# Patient Record
Sex: FEMALE | Race: WHITE | Employment: OTHER | URBAN - METROPOLITAN AREA
[De-identification: names, ages, dates, MRNs, and addresses within clinical notes are randomized per-mention and may not be internally consistent; named-entity substitution may affect disease eponyms.]

---

## 2018-01-29 ENCOUNTER — HOSPITAL ENCOUNTER (OUTPATIENT)
Dept: MAMMOGRAPHY | Age: 43
Discharge: HOME | End: 2018-01-29
Attending: OBSTETRICS & GYNECOLOGY
Payer: COMMERCIAL

## 2018-01-29 DIAGNOSIS — Z12.39 BREAST CANCER SCREENING: ICD-10-CM

## 2018-01-29 PROCEDURE — 77067 SCR MAMMO BI INCL CAD: CPT

## 2018-08-16 PROBLEM — N80.03 ADENOMYOSIS: Status: ACTIVE | Noted: 2018-08-16

## 2018-08-16 PROBLEM — N80.30 PELVIC PERITONEAL ENDOMETRIOSIS: Status: ACTIVE | Noted: 2018-08-16

## 2018-11-01 ENCOUNTER — HOSPITAL ENCOUNTER (OUTPATIENT)
Dept: ULTRASOUND IMAGING | Age: 43
Discharge: HOME | End: 2018-11-01
Payer: COMMERCIAL

## 2018-11-01 DIAGNOSIS — M79.672 LEFT FOOT PAIN: ICD-10-CM

## 2018-11-01 PROCEDURE — 93926 LOWER EXTREMITY STUDY: CPT

## 2019-06-12 ENCOUNTER — HOSPITAL ENCOUNTER (OUTPATIENT)
Dept: MAMMOGRAPHY | Age: 44
Discharge: HOME | End: 2019-06-12
Attending: OBSTETRICS & GYNECOLOGY

## 2019-06-12 DIAGNOSIS — Z12.39 BREAST CANCER SCREENING: ICD-10-CM

## 2020-07-24 ENCOUNTER — HOSPITAL ENCOUNTER (OUTPATIENT)
Dept: MAMMOGRAPHY | Age: 45
Discharge: HOME | End: 2020-07-24
Attending: OBSTETRICS & GYNECOLOGY
Payer: COMMERCIAL

## 2020-07-24 DIAGNOSIS — Z12.31 VISIT FOR SCREENING MAMMOGRAM: ICD-10-CM

## 2020-07-24 PROCEDURE — 77063 BREAST TOMOSYNTHESIS BI: CPT

## 2021-11-23 ENCOUNTER — HOSPITAL ENCOUNTER (OUTPATIENT)
Dept: SURGERY | Age: 46
Discharge: HOME | End: 2021-11-23

## 2021-11-23 VITALS — WEIGHT: 148 LBS | BODY MASS INDEX: 25.27 KG/M2 | HEIGHT: 64 IN

## 2021-11-23 RX ORDER — BACILLUS COAGULANS 250MM CELL
1 TABLET,CHEWABLE ORAL DAILY
COMMUNITY

## 2021-11-23 NOTE — PERIOP NOTES
Patient verified name and . Order for consent NOT found in EHR; patient verifies procedure. Type 2 surgery, phone assessment complete. Orders NOT received. Labs per surgeon: unknown; no orders received in EHR at time of assessment. Labs per anesthesia protocol: HGB-coming to the outpatient lab. Instructed on where and when to come. EKG: not needed per anesthesia protocols. Patient COVID test date 21; Patient aware of the appointment. The testing center is located at the . [address] , [place]. If appointment is needed-patient provided telephone number of [phone]. Patient answered medical/surgical history questions at their best of ability. All prior to admission medications documented in [facility]. Patient instructed to take the following medications the day of surgery according to anesthesia guidelines with a small sip of water: none. On the day before surgery please take Acetaminophen 1000mg in the morning and then again before bed. You may substitute for Tylenol 650 mg. Hold all vitamins 7 days prior to surgery and NSAIDS 5 days prior to surgery. Prescription meds to hold: none. Patient instructed on the following:    > Arrive at A Entrance, time of arrival to be called the day before by 1700  > NPO after midnight including gum, mints, and ice chips  > Responsible adult must drive patient to the hospital, stay during surgery, and patient will need supervision 24 hours after anesthesia  > Use dial antibacterial soap in shower the night before surgery and on the morning of surgery  > All piercings must be removed prior to arrival.    > Leave all valuables (money and jewelry) at home but bring insurance card and ID on DOS.   > You may be required to pay a deductible or co-pay on the day of your procedure.  You can pre-pay by calling [phone] if your surgery is at the [facility] or [phone] if your surgery is at the [facility] campus. > Do not wear make-up, nail polish, lotions, cologne, perfumes, powders, or oil on skin. Artificial nails are not permitted.

## 2021-11-26 ENCOUNTER — HOSPITAL ENCOUNTER (OUTPATIENT)
Dept: LAB | Age: 46
Discharge: HOME | End: 2021-11-26
Attending: OBSTETRICS & GYNECOLOGY
Payer: COMMERCIAL

## 2021-11-26 LAB — HGB BLD-MCNC: 12.7 G/DL (ref 11.7–15.4)

## 2021-11-26 PROCEDURE — 85018 HEMOGLOBIN: CPT

## 2021-11-26 PROCEDURE — 36415 COLL VENOUS BLD VENIPUNCTURE: CPT

## 2021-11-29 NOTE — H&P
Gynecology History and Physical    Name: Dewayne Vargas MRN: [id number] SSN: [id number]    YOB: 1975  Age: 42 y.o. Sex: female       Subjective:      Chief complaint:  Pelvic pain    Narda Bolanos is a 42 y.o.  female with a history of chronic pelvic pain. Previous workup included laparoscopy with proven endometriosis and USG which is suggestive of adenomyosis. Previous treatment measures included nonsteroidal anti-inflammatory drugs (NSAID's) and oral contraceptives. She is admitted for Procedure(s) (LRB):  LAPAROSCOPY GYN DIAGNOSTIC (N/A)  WITH ARGON LASER ABLATION  OF ENDOMETRIOSIS (N/A). The current method of family planning is none. OB History        2    Para   2    Term   2            AB        Living   2       SAB        IAB        Ectopic        Molar        Multiple        Live Births   2              Past Medical History:   Diagnosis Date    Abnormal Papanicolaou smear of cervix     Anemia     no treatment currently     Endometriosis     Nausea & vomiting     severe post-op N/V     Pneumonia     HX    Ruptured uterus during labor     after a v-christine attempt    Uterine polyp      Past Surgical History:   Procedure Laterality Date    HX ABDOMINAL LAPAROSCOPY      x2 for Endometriosis    HX  SECTION      x2    HX CHOLECYSTECTOMY  2019    HX DILATION AND CURETTAGE       Social History     Occupational History    Not on file   Tobacco Use    Smoking status: Never Smoker    Smokeless tobacco: Never Used   Vaping Use    Vaping Use: Never used   Substance and Sexual Activity    Alcohol use: Yes     Comment: occ    Drug use: No    Sexual activity: Yes     Partners: Male     Birth control/protection: Pill     Family History   Problem Relation Age of Onset    Hypertension Mother     Other Mother         Large intestine removed.     Heart Disease Father     Hypertension Father     Heart Attack Father         Four Heart Attacks    Diabetes Paternal Grandfather     Heart Disease Paternal Grandfather     Heart Attack Paternal Grandfather     No Known Problems Maternal Grandmother     No Known Problems Maternal Grandfather     No Known Problems Paternal Grandmother     Endometriosis Sister         Hysterectomy at 29    Endometriosis Paternal Aunt         Several Paternal Aunts.  Other Brother         Premature birth- Lived 4 days    No Known Problems Other         Son (9years old) was dx in May with Hereditary Spherocytosis.  Other Child         hereditary spherecitosis     Breast Cancer Neg Hx         Allergies   Allergen Reactions    Sulfa (Sulfonamide Antibiotics) Hives, Rash and Nausea and Vomiting     Fever    Zithromax [Azithromycin] Hives and Other (comments)     Abdominal pain       Prior to Admission medications    Medication Sig Start Date End Date Taking? Authorizing Provider   mv-mn/iron fum/FA/omega3,6,9#3 (WOMEN'S MULTI PO) Take 1 Tablet by mouth daily. Provider, Historical   Bacillus coagulans (Digestive Advantage Prob Gummy) 250 million cell chew Take 1 Tablet by mouth daily. Provider, Historical        Review of Systems:  A comprehensive review of systems was negative except for that written in the History of Present Illness. Objective: There were no vitals filed for this visit. Physical Exam:  Patient without distress.   Heart: Regular rate and rhythm  Lung: clear to auscultation throughout lung fields, no wheezes, no rales, no rhonchi and normal respiratory effort  Back: costovertebral angle tenderness absent  Abdomen: soft, nontender  External Genitalia: normal general appearance  Vagina: normal mucosa without prolapse or lesions  Cervix: normal appearance  Adnexa: normal bimanual exam and tenderness  Uterus: tender    Assessment:     Principal Problem:    Pelvic peritoneal endometriosis (8/16/2018)    Active Problems:    Adenomyosis (8/16/2018)       Chronic pelvic pain with endometriosis    Plan: Procedure(s) (LRB):  LAPAROSCOPY GYN DIAGNOSTIC (N/A)  WITH ARGON LASER ABLATION  OF ENDOMETRIOSIS (N/A)  Discussed the risks of surgery including the risks of bleeding, infection, deep vein thrombosis, and surgical injuries to internal organs including but not limited to the bowels, bladder, rectum, and female reproductive organs. The patient understands the risks; any and all questions were answered to the patient's satisfaction.

## 2021-11-30 ENCOUNTER — ANESTHESIA EVENT (OUTPATIENT)
Dept: SURGERY | Age: 46
End: 2021-11-30
Payer: COMMERCIAL

## 2021-12-01 ENCOUNTER — ANESTHESIA (OUTPATIENT)
Dept: SURGERY | Age: 46
End: 2021-12-01
Payer: COMMERCIAL

## 2021-12-01 ENCOUNTER — HOSPITAL ENCOUNTER (OUTPATIENT)
Age: 46
Discharge: HOME | End: 2021-12-01
Attending: OBSTETRICS & GYNECOLOGY | Admitting: OBSTETRICS & GYNECOLOGY
Payer: COMMERCIAL

## 2021-12-01 VITALS
BODY MASS INDEX: 25.47 KG/M2 | WEIGHT: 148.4 LBS | SYSTOLIC BLOOD PRESSURE: 116 MMHG | DIASTOLIC BLOOD PRESSURE: 57 MMHG | HEART RATE: 83 BPM | OXYGEN SATURATION: 94 % | TEMPERATURE: 98.6 F | RESPIRATION RATE: 16 BRPM

## 2021-12-01 DIAGNOSIS — R10.2 PELVIC PAIN: ICD-10-CM

## 2021-12-01 DIAGNOSIS — N80.30 PELVIC PERITONEAL ENDOMETRIOSIS: Primary | ICD-10-CM

## 2021-12-01 LAB
ERYTHROCYTE [DISTWIDTH] IN BLOOD BY AUTOMATED COUNT: 13.3 % (ref 11.9–14.6)
HCG UR QL: NEGATIVE
HCT VFR BLD AUTO: 37.8 % (ref 35.8–46.3)
HGB BLD-MCNC: 13 G/DL (ref 11.7–15.4)
MCH RBC QN AUTO: 31 PG (ref 26.1–32.9)
MCHC RBC AUTO-ENTMCNC: 34.4 G/DL (ref 31.4–35)
MCV RBC AUTO: 90.2 FL (ref 79.6–97.8)
NRBC # BLD: 0 K/UL (ref 0–0.2)
PLATELET # BLD AUTO: 215 K/UL (ref 150–450)
PMV BLD AUTO: 9.3 FL (ref 9.4–12.3)
RBC # BLD AUTO: 4.19 M/UL (ref 4.05–5.2)
WBC # BLD AUTO: 6.3 K/UL (ref 4.3–11.1)

## 2021-12-01 PROCEDURE — 58662 LAPAROSCOPY EXCISE LESIONS: CPT | Performed by: OBSTETRICS & GYNECOLOGY

## 2021-12-01 PROCEDURE — 74011000250 HC RX REV CODE- 250: Performed by: OBSTETRICS & GYNECOLOGY

## 2021-12-01 PROCEDURE — 74011250636 HC RX REV CODE- 250/636: Performed by: ANESTHESIOLOGY

## 2021-12-01 PROCEDURE — 76210000016 HC OR PH I REC 1 TO 1.5 HR: Performed by: OBSTETRICS & GYNECOLOGY

## 2021-12-01 PROCEDURE — 77030020829: Performed by: OBSTETRICS & GYNECOLOGY

## 2021-12-01 PROCEDURE — 74011000250 HC RX REV CODE- 250: Performed by: ANESTHESIOLOGY

## 2021-12-01 PROCEDURE — 77030039425 HC BLD LARYNG TRULITE DISP TELE -A: Performed by: ANESTHESIOLOGY

## 2021-12-01 PROCEDURE — 77030017963 HC PRB COAG1 CNMD -C: Performed by: OBSTETRICS & GYNECOLOGY

## 2021-12-01 PROCEDURE — 76010000161 HC OR TIME 1 TO 1.5 HR INTENSV-TIER 1: Performed by: OBSTETRICS & GYNECOLOGY

## 2021-12-01 PROCEDURE — 2709999900 HC NON-CHARGEABLE SUPPLY: Performed by: OBSTETRICS & GYNECOLOGY

## 2021-12-01 PROCEDURE — 77030040922 HC BLNKT HYPOTHRM STRY -A: Performed by: ANESTHESIOLOGY

## 2021-12-01 PROCEDURE — 77030008606 HC TRCR ENDOSC KII AMR -B: Performed by: OBSTETRICS & GYNECOLOGY

## 2021-12-01 PROCEDURE — 74011250637 HC RX REV CODE- 250/637: Performed by: ANESTHESIOLOGY

## 2021-12-01 PROCEDURE — 76060000033 HC ANESTHESIA 1 TO 1.5 HR: Performed by: OBSTETRICS & GYNECOLOGY

## 2021-12-01 PROCEDURE — 77030018836 HC SOL IRR NACL ICUM -A: Performed by: OBSTETRICS & GYNECOLOGY

## 2021-12-01 PROCEDURE — 77030037088 HC TUBE ENDOTRACH ORAL NSL COVD-A: Performed by: ANESTHESIOLOGY

## 2021-12-01 PROCEDURE — 77030031139 HC SUT VCRL2 J&J -A: Performed by: OBSTETRICS & GYNECOLOGY

## 2021-12-01 PROCEDURE — 76210000020 HC REC RM PH II FIRST 0.5 HR: Performed by: OBSTETRICS & GYNECOLOGY

## 2021-12-01 PROCEDURE — 77030008522 HC TBNG INSUF LAPRO STRY -B: Performed by: OBSTETRICS & GYNECOLOGY

## 2021-12-01 PROCEDURE — 77030011502 HC MANIP UTER ZUM ZINN -B: Performed by: OBSTETRICS & GYNECOLOGY

## 2021-12-01 PROCEDURE — 77030000038 HC TIP SCIS LAPSCP SURI -B: Performed by: OBSTETRICS & GYNECOLOGY

## 2021-12-01 PROCEDURE — 74011250636 HC RX REV CODE- 250/636: Performed by: OBSTETRICS & GYNECOLOGY

## 2021-12-01 PROCEDURE — 81025 URINE PREGNANCY TEST: CPT

## 2021-12-01 PROCEDURE — 77030028750 HC FCPS ENDOSC BPLR HALO OCOA -E: Performed by: OBSTETRICS & GYNECOLOGY

## 2021-12-01 PROCEDURE — 85027 COMPLETE CBC AUTOMATED: CPT

## 2021-12-01 RX ORDER — SODIUM CHLORIDE 0.9 % (FLUSH) 0.9 %
5-40 SYRINGE (ML) INJECTION AS NEEDED
Status: DISCONTINUED | OUTPATIENT
Start: 2021-12-01 | End: 2021-12-01 | Stop reason: HOSPADM

## 2021-12-01 RX ORDER — ROCURONIUM BROMIDE 10 MG/ML
INJECTION, SOLUTION INTRAVENOUS AS NEEDED
Status: DISCONTINUED | OUTPATIENT
Start: 2021-12-01 | End: 2021-12-01 | Stop reason: HOSPADM

## 2021-12-01 RX ORDER — OXYCODONE HYDROCHLORIDE 5 MG/1
10 TABLET ORAL
Status: DISCONTINUED | OUTPATIENT
Start: 2021-12-01 | End: 2021-12-01 | Stop reason: HOSPADM

## 2021-12-01 RX ORDER — LIDOCAINE HYDROCHLORIDE 20 MG/ML
INJECTION, SOLUTION EPIDURAL; INFILTRATION; INTRACAUDAL; PERINEURAL AS NEEDED
Status: DISCONTINUED | OUTPATIENT
Start: 2021-12-01 | End: 2021-12-01 | Stop reason: HOSPADM

## 2021-12-01 RX ORDER — HYDROMORPHONE HYDROCHLORIDE 2 MG/ML
0.5 INJECTION, SOLUTION INTRAMUSCULAR; INTRAVENOUS; SUBCUTANEOUS
Status: DISCONTINUED | OUTPATIENT
Start: 2021-12-01 | End: 2021-12-01 | Stop reason: HOSPADM

## 2021-12-01 RX ORDER — OXYCODONE AND ACETAMINOPHEN 5; 325 MG/1; MG/1
1 TABLET ORAL
Qty: 12 TABLET | Refills: 0 | Status: SHIPPED | OUTPATIENT
Start: 2021-12-01 | End: 2021-12-04

## 2021-12-01 RX ORDER — LORAZEPAM 2 MG/ML
1 INJECTION INTRAMUSCULAR
Status: DISCONTINUED | OUTPATIENT
Start: 2021-12-01 | End: 2021-12-01 | Stop reason: HOSPADM

## 2021-12-01 RX ORDER — NEOSTIGMINE METHYLSULFATE 1 MG/ML
INJECTION, SOLUTION INTRAVENOUS AS NEEDED
Status: DISCONTINUED | OUTPATIENT
Start: 2021-12-01 | End: 2021-12-01 | Stop reason: HOSPADM

## 2021-12-01 RX ORDER — ONDANSETRON 2 MG/ML
INJECTION INTRAMUSCULAR; INTRAVENOUS AS NEEDED
Status: DISCONTINUED | OUTPATIENT
Start: 2021-12-01 | End: 2021-12-01 | Stop reason: HOSPADM

## 2021-12-01 RX ORDER — NALOXONE HYDROCHLORIDE 0.4 MG/ML
0.1 INJECTION, SOLUTION INTRAMUSCULAR; INTRAVENOUS; SUBCUTANEOUS AS NEEDED
Status: DISCONTINUED | OUTPATIENT
Start: 2021-12-01 | End: 2021-12-01 | Stop reason: HOSPADM

## 2021-12-01 RX ORDER — ACETAMINOPHEN 500 MG
TABLET ORAL
COMMUNITY

## 2021-12-01 RX ORDER — MIDAZOLAM HYDROCHLORIDE 1 MG/ML
2 INJECTION, SOLUTION INTRAMUSCULAR; INTRAVENOUS ONCE
Status: COMPLETED | OUTPATIENT
Start: 2021-12-01 | End: 2021-12-01

## 2021-12-01 RX ORDER — OXYCODONE HYDROCHLORIDE 5 MG/1
5 TABLET ORAL
Status: DISCONTINUED | OUTPATIENT
Start: 2021-12-01 | End: 2021-12-01 | Stop reason: HOSPADM

## 2021-12-01 RX ORDER — DIPHENHYDRAMINE HYDROCHLORIDE 50 MG/ML
12.5 INJECTION, SOLUTION INTRAMUSCULAR; INTRAVENOUS ONCE
Status: DISCONTINUED | OUTPATIENT
Start: 2021-12-01 | End: 2021-12-01 | Stop reason: HOSPADM

## 2021-12-01 RX ORDER — FENTANYL CITRATE 50 UG/ML
INJECTION, SOLUTION INTRAMUSCULAR; INTRAVENOUS AS NEEDED
Status: DISCONTINUED | OUTPATIENT
Start: 2021-12-01 | End: 2021-12-01 | Stop reason: HOSPADM

## 2021-12-01 RX ORDER — SODIUM CHLORIDE, SODIUM LACTATE, POTASSIUM CHLORIDE, CALCIUM CHLORIDE 600; 310; 30; 20 MG/100ML; MG/100ML; MG/100ML; MG/100ML
100 INJECTION, SOLUTION INTRAVENOUS CONTINUOUS
Status: DISCONTINUED | OUTPATIENT
Start: 2021-12-01 | End: 2021-12-01 | Stop reason: HOSPADM

## 2021-12-01 RX ORDER — GLYCOPYRROLATE 0.2 MG/ML
INJECTION INTRAMUSCULAR; INTRAVENOUS AS NEEDED
Status: DISCONTINUED | OUTPATIENT
Start: 2021-12-01 | End: 2021-12-01 | Stop reason: HOSPADM

## 2021-12-01 RX ORDER — SCOLOPAMINE TRANSDERMAL SYSTEM 1 MG/1
1 PATCH, EXTENDED RELEASE TRANSDERMAL
Status: DISCONTINUED | OUTPATIENT
Start: 2021-12-01 | End: 2021-12-01 | Stop reason: HOSPADM

## 2021-12-01 RX ORDER — CEFAZOLIN SODIUM/WATER 2 G/20 ML
2 SYRINGE (ML) INTRAVENOUS ONCE
Status: COMPLETED | OUTPATIENT
Start: 2021-12-01 | End: 2021-12-01

## 2021-12-01 RX ORDER — MIDAZOLAM HYDROCHLORIDE 1 MG/ML
2 INJECTION, SOLUTION INTRAMUSCULAR; INTRAVENOUS
Status: DISCONTINUED | OUTPATIENT
Start: 2021-12-01 | End: 2021-12-01 | Stop reason: HOSPADM

## 2021-12-01 RX ORDER — PROPOFOL 10 MG/ML
INJECTION, EMULSION INTRAVENOUS AS NEEDED
Status: DISCONTINUED | OUTPATIENT
Start: 2021-12-01 | End: 2021-12-01 | Stop reason: HOSPADM

## 2021-12-01 RX ORDER — BUPIVACAINE HYDROCHLORIDE 5 MG/ML
INJECTION, SOLUTION EPIDURAL; INTRACAUDAL AS NEEDED
Status: DISCONTINUED | OUTPATIENT
Start: 2021-12-01 | End: 2021-12-01 | Stop reason: HOSPADM

## 2021-12-01 RX ORDER — ONDANSETRON 2 MG/ML
4 INJECTION INTRAMUSCULAR; INTRAVENOUS ONCE
Status: DISCONTINUED | OUTPATIENT
Start: 2021-12-01 | End: 2021-12-01 | Stop reason: HOSPADM

## 2021-12-01 RX ORDER — DEXAMETHASONE SODIUM PHOSPHATE 100 MG/10ML
INJECTION INTRAMUSCULAR; INTRAVENOUS AS NEEDED
Status: DISCONTINUED | OUTPATIENT
Start: 2021-12-01 | End: 2021-12-01 | Stop reason: HOSPADM

## 2021-12-01 RX ORDER — KETOROLAC TROMETHAMINE 30 MG/ML
INJECTION, SOLUTION INTRAMUSCULAR; INTRAVENOUS AS NEEDED
Status: DISCONTINUED | OUTPATIENT
Start: 2021-12-01 | End: 2021-12-01 | Stop reason: HOSPADM

## 2021-12-01 RX ORDER — SODIUM CHLORIDE 0.9 % (FLUSH) 0.9 %
5-40 SYRINGE (ML) INJECTION EVERY 8 HOURS
Status: DISCONTINUED | OUTPATIENT
Start: 2021-12-01 | End: 2021-12-01 | Stop reason: HOSPADM

## 2021-12-01 RX ORDER — ACETAMINOPHEN 500 MG
1000 TABLET ORAL ONCE
Status: DISCONTINUED | OUTPATIENT
Start: 2021-12-01 | End: 2021-12-01 | Stop reason: HOSPADM

## 2021-12-01 RX ORDER — SODIUM CHLORIDE, SODIUM LACTATE, POTASSIUM CHLORIDE, CALCIUM CHLORIDE 600; 310; 30; 20 MG/100ML; MG/100ML; MG/100ML; MG/100ML
75 INJECTION, SOLUTION INTRAVENOUS CONTINUOUS
Status: DISCONTINUED | OUTPATIENT
Start: 2021-12-01 | End: 2021-12-01 | Stop reason: HOSPADM

## 2021-12-01 RX ORDER — LIDOCAINE HYDROCHLORIDE 10 MG/ML
0.1 INJECTION INFILTRATION; PERINEURAL AS NEEDED
Status: DISCONTINUED | OUTPATIENT
Start: 2021-12-01 | End: 2021-12-01 | Stop reason: HOSPADM

## 2021-12-01 RX ORDER — FENTANYL CITRATE 50 UG/ML
100 INJECTION, SOLUTION INTRAMUSCULAR; INTRAVENOUS ONCE
Status: DISCONTINUED | OUTPATIENT
Start: 2021-12-01 | End: 2021-12-01 | Stop reason: HOSPADM

## 2021-12-01 RX ORDER — IBUPROFEN 600 MG/1
TABLET ORAL
Qty: 30 TABLET | Refills: 0 | Status: SHIPPED | OUTPATIENT
Start: 2021-12-01 | End: 2021-12-11

## 2021-12-01 RX ORDER — KETAMINE HYDROCHLORIDE 50 MG/ML
INJECTION, SOLUTION INTRAMUSCULAR; INTRAVENOUS AS NEEDED
Status: DISCONTINUED | OUTPATIENT
Start: 2021-12-01 | End: 2021-12-01 | Stop reason: HOSPADM

## 2021-12-01 RX ADMIN — Medication 3 MG: at 08:03

## 2021-12-01 RX ADMIN — KETOROLAC TROMETHAMINE 30 MG: 30 INJECTION, SOLUTION INTRAMUSCULAR; INTRAVENOUS at 07:57

## 2021-12-01 RX ADMIN — HYDROMORPHONE HYDROCHLORIDE 0.5 MG: 2 INJECTION, SOLUTION INTRAMUSCULAR; INTRAVENOUS; SUBCUTANEOUS at 08:55

## 2021-12-01 RX ADMIN — LIDOCAINE HYDROCHLORIDE 100 MG: 20 INJECTION, SOLUTION EPIDURAL; INFILTRATION; INTRACAUDAL; PERINEURAL at 07:16

## 2021-12-01 RX ADMIN — SODIUM CHLORIDE, SODIUM LACTATE, POTASSIUM CHLORIDE, AND CALCIUM CHLORIDE 100 ML/HR: 600; 310; 30; 20 INJECTION, SOLUTION INTRAVENOUS at 06:12

## 2021-12-01 RX ADMIN — KETAMINE HYDROCHLORIDE 30 MG: 50 INJECTION, SOLUTION INTRAMUSCULAR; INTRAVENOUS at 07:16

## 2021-12-01 RX ADMIN — CEFAZOLIN 2 G: 1 INJECTION, POWDER, FOR SOLUTION INTRAVENOUS at 07:06

## 2021-12-01 RX ADMIN — FENTANYL CITRATE 100 MCG: 50 INJECTION INTRAMUSCULAR; INTRAVENOUS at 07:16

## 2021-12-01 RX ADMIN — PROPOFOL 200 MG: 10 INJECTION, EMULSION INTRAVENOUS at 07:16

## 2021-12-01 RX ADMIN — ROCURONIUM BROMIDE 30 MG: 10 INJECTION, SOLUTION INTRAVENOUS at 07:16

## 2021-12-01 RX ADMIN — DEXAMETHASONE SODIUM PHOSPHATE 8 MG: 10 INJECTION INTRAMUSCULAR; INTRAVENOUS at 07:31

## 2021-12-01 RX ADMIN — GLYCOPYRROLATE 0.4 MG: 0.2 INJECTION, SOLUTION INTRAMUSCULAR; INTRAVENOUS at 08:03

## 2021-12-01 RX ADMIN — HYDROMORPHONE HYDROCHLORIDE 0.5 MG: 2 INJECTION, SOLUTION INTRAMUSCULAR; INTRAVENOUS; SUBCUTANEOUS at 08:35

## 2021-12-01 RX ADMIN — ONDANSETRON 4 MG: 2 INJECTION INTRAMUSCULAR; INTRAVENOUS at 07:57

## 2021-12-01 RX ADMIN — MIDAZOLAM 2 MG: 1 INJECTION INTRAMUSCULAR; INTRAVENOUS at 07:05

## 2021-12-01 RX ADMIN — HYDROMORPHONE HYDROCHLORIDE 0.5 MG: 2 INJECTION, SOLUTION INTRAMUSCULAR; INTRAVENOUS; SUBCUTANEOUS at 08:45

## 2021-12-01 RX ADMIN — HYDROMORPHONE HYDROCHLORIDE 0.5 MG: 2 INJECTION, SOLUTION INTRAMUSCULAR; INTRAVENOUS; SUBCUTANEOUS at 08:25

## 2021-12-01 NOTE — DISCHARGE INSTRUCTIONS
INSTRUCTIONS FOLLOWING GYN LAPAROSCOPY    ACTIVITY   Limit activity today; increase activity tomorrow, but no vigorous exercise   Shower only; no tub baths   No douches, tampons or intercourse until your doctor releases you (at least 2 weeks)   May return to work or school as directed by your doctor    DIET   Clear liquids until no nausea or vomiting   Advance to regular diet as tolerated    PAIN   Expect a moderate amount of pain.  Take pain medication as directed by your doctor. If no prescription for pain is sent home with you, take the appropriate dose of your commonly used pain medication.  Call you doctor if pain is NOT relieved by medication.  DO NOT take aspirin or blood thinners until directed by your doctor. DRESSING CARE   Change dressing / band aids as directed by your doctor. You will experience bleeding similar to a period for the next couple of days followed by watery discharge up to seven more days. FOLLOW PHONE [facility] will be made by nursing staff.  If you have any problems or concerns, call your doctor as needed. CALL YOUR DOCTOR IF   Excessive bleeding that does not stop after holding mild pressure over the area for 15 minutes   You soak a pad in an hour   Temperature of 101°F or above   Green or yellow, smelly drainage or discharge   You are unable to urinate by bedtime   Nausea and vomiting that does not stop by bedtime    MEDICATION INTERACTION:  During your procedure you potentially received a medication or medications which may reduce the effectiveness of oral contraceptives. Please consider other forms of contraception for 1 month following your procedure if you are currently using oral contraceptives as your primary form of birth control.  In addition to this, we recommend continuing your oral contraceptive as prescribed, unless otherwise instructed by your physician, during this time    After general anesthesia or intravenous sedation, for 24 hours or while taking prescription Narcotics:  · Limit your activities  · A responsible adult needs to be with you for the next 24 hours  · Do not drive and operate hazardous machinery  · Do not make important personal or business decisions  · Do not drink alcoholic beverages  · If you have not urinated within 8 hours after discharge, please contact your surgeon on call. · If you have sleep apnea and have a CPAP machine, please use it for all naps and sleeping. · Please use caution when taking narcotics and any of your home medications that may cause drowsiness. *  Please give a list of your current medications to your Primary Care Provider. *  Please update this list whenever your medications are discontinued, doses are      changed, or new medications (including over-the-counter products) are added. *  Please carry medication information at all times in case of emergency situations. These are general instructions for a healthy lifestyle:  No smoking/ No tobacco products/ Avoid exposure to second hand smoke  Surgeon General's Warning:  Quitting smoking now greatly reduces serious risk to your health. Obesity, smoking, and sedentary lifestyle greatly increases your risk for illness  A healthy diet, regular physical exercise & weight monitoring are important for maintaining a healthy lifestyle    You may be retaining fluid if you have a history of heart failure or if you experience any of the following symptoms:  Weight gain of 3 pounds or more overnight or 5 pounds in a week, increased swelling in our hands or feet or shortness of breath while lying flat in bed. Please call your doctor as soon as you notice any of these symptoms; do not wait until your next office visit.

## 2021-12-01 NOTE — OP NOTES
[address] [address]  OPERATIVE REPORT    Name:  Lexus Pina  MR#:  [id number]  :  1975  ACCOUNT #:  [de-identified]  DATE OF SERVICE:  2021    PREOPERATIVE DIAGNOSES:  1. Chronic pelvic pain. 2.  Pelvic endometriosis as diagnosed by laparoscopy. POSTOPERATIVE DIAGNOSES:  1. Chronic pelvic pain. 2.  Pelvic endometriosis as diagnosed by laparoscopy. PROCEDURE PERFORMED:  Diagnostic laparoscopy with laparoscopic laser fulguration of pelvic peritoneal endometriosis implants and lysis of peritoneal adhesions. SURGEON:  Nathalia Crane MD    ASSISTANT SURGEON:  Dominique Ledesma MD.  His presence was required to facilitate laparoscopic elevation of the uterus as well as retraction and countertraction during the dissection and laser ablation of the pelvic viscera. ANESTHESIA:  General endotracheal anesthesia. COMPLICATIONS:  None. SPECIMENS REMOVED:  none. ESTIMATED BLOOD LOSS:  10 mL. IV FLUIDS:  500 mL of crystalloid. URINE OUTPUT:  200 mL of clear urine drained throughout the case. FINDINGS:  Normal-appearing external genitalia, Bartholin, Steptoe, urethral glands, vulva, vagina, and cervix. Normal-appearing abdominal viscera and bladder with adhesions noted anteriorly between the lower uterine segment and bladder. Endometriosis implants noted throughout the posterior cul-de-sac with filmy adhesions appreciated between the posterior cul-de-sac in the lower uterine segment and uterosacral ligaments. PROCEDURE:  The patient was taken to the operating room where general anesthesia was obtained without difficulty. She was then sterilely prepped and draped in the dorsal lithotomy position in [address] [place] [address] in the usual sterile fashion. After confirming the patient's identification and the nature of the procedure, the weighted sterile speculum was placed into the patient's posterior fornix of the vagina.   The anterior lip of the cervix was visualized with a narrow [place]. It was grasped with a single-tooth tenaculum and the narrow [place] was removed. The uterus sounded to a depth of 9.5 cm. It was noted to be spontaneously dilated to a diameter of 21-Japanese and a HUMI uterine manipulator was gently introduced into the endometrial cavity and the bulb filled with saline for the purposes of uterine manipulation. The single-tooth tenaculum was removed with no bleeding from the tenaculum sites and the weighted sterile speculum was removed from the vagina. Dumont catheter was introduced under sterile technique and attention was then turned to the abdomen. After changing gloves, the umbilicus was instilled with 3 mL of 0.5% Marcaine without epinephrine. A 5 mm stab incision was made with a scalpel and the Optiview trocar was utilized to introduce the laparoscope into the abdomen under direct visualization. A 4 liters of CO2 gas was utilized to obtain pneumoperitoneum and a survey of the abdomen and pelvis was undertaken with the above findings appreciated. Subsequently, right lower quadrant was assessed. Previous laparoscopy port site was identified in an avascular space. An incision was made in the skin after instillation of local anesthetic was accomplished. A blunt trocar was introduced through this incision under direct visualization of laparoscope without difficulty. There was no bleeding noted from the anterior abdominal wall. In a similar fashion, the left lower quadrant of her abdomen was assessed. There was an avascular space noted and a prior laparoscopic port site identified. The abdominal wall was instilled with local anesthetic and a 5 mm incision made in this area. The blunt trocar was introduced through the incision into the abdomen under direct visualization without difficulty with no bleeding noted from the anterior abdominal wall. The argon laser was then utilized to ablate and fulgurate the areas of endometriosis implants.   The heated [place] were then utilized to elevate the peritoneal windows that were appreciated in the posterior cul-de-sac and with monopolar cautery applied, these adhesions were divided. The laparoscopic scissors were utilized to transect these adhesions as well after ablation had occurred to ensure hemostasis. The adhesions between the lower uterine segment and bladder were then carefully dissected free as well using the argon laser to provide hemostasis and sharp dissection with laparoscopic scissors effectively releasing this adhesion area. There was an area on the right fallopian tube which was also identified to have significant endometriosis. This was ablated with the argon laser with careful attention paid to avoid damaging any of the surrounding pelvic viscera. Excellent hemostasis was observed. At this point in time, the case was deemed concluded. The peripheral ports were removed under direct visualization of laparoscope with no bleeding noted from the abdominal wall. The pneumoperitoneum was allowed to completely escape, and the umbilical port and laparoscope were removed together without difficulty. Skin was closed with 4-0 Vicryl in a subcuticular stitch and Dermabond on top of the skin. The HUMI uterine manipulator was removed from the cervix without difficulty and no bleeding noted from the uterus or vaginal area. The Dumont catheter was removed and the sponge, lap, and needle counts were correct. The patient went to recovery room in stable condition.       MD PATTIE [id number]  D:  12/01/2021 8:15  T:  12/01/2021 12:50  JOB #:  [id number]

## 2021-12-01 NOTE — ANESTHESIA PREPROCEDURE EVALUATION
Relevant Problems   No relevant active problems       Anesthetic History     PONV          Review of Systems / Medical History  Patient summary reviewed and pertinent labs reviewed    Pulmonary  Within defined limits                 Neuro/Psych   Within defined limits           Cardiovascular                  Exercise tolerance: >4 METS     GI/Hepatic/Renal  Within defined limits              Endo/Other        Anemia     Other Findings              Physical Exam    Airway  Mallampati: II  TM Distance: 4 - 6 cm  Neck ROM: normal range of motion   Mouth opening: Normal     Cardiovascular    Rhythm: regular  Rate: normal         Dental  No notable dental hx       Pulmonary  Breath sounds clear to auscultation               Abdominal  GI exam deferred       Other Findings            Anesthetic Plan    ASA: 2  Anesthesia type: general            Anesthetic plan and risks discussed with: Patient

## 2021-12-01 NOTE — ANESTHESIA POSTPROCEDURE EVALUATION
Procedure(s):  LAPAROSCOPY GYN DIAGNOSTIC  WITH ARGON LASER ABLATION  OF ENDOMETRIOSIS. general    Anesthesia Post Evaluation      Multimodal analgesia: multimodal analgesia used between 6 hours prior to anesthesia start to PACU discharge  Patient location during evaluation: bedside  Patient participation: complete - patient participated  Level of consciousness: responsive to verbal stimuli  Pain management: adequate  Airway patency: patent  Anesthetic complications: no  Cardiovascular status: hemodynamically stable  Respiratory status: spontaneous ventilation  Hydration status: stable    Final Post Anesthesia Temperature Assessment:  Normothermia (36.0-37.5 degrees C)      INITIAL Post-op Vital signs:   Vitals Value Taken Time   /59 12/01/21 0925   Temp 37 °C (98.6 °F) 12/01/21 0820   Pulse 85 12/01/21 0927   Resp 16 12/01/21 0905   SpO2 95 % 12/01/21 0927   Vitals shown include unvalidated device data.

## 2022-03-18 PROBLEM — R10.2 PELVIC PAIN: Status: ACTIVE | Noted: 2021-12-01

## 2022-03-19 PROBLEM — N80.03 ADENOMYOSIS: Status: ACTIVE | Noted: 2018-08-16

## 2022-03-20 PROBLEM — N80.30 PELVIC PERITONEAL ENDOMETRIOSIS: Status: ACTIVE | Noted: 2018-08-16

## 2022-04-05 ENCOUNTER — HOSPITAL ENCOUNTER (OUTPATIENT)
Dept: ULTRASOUND IMAGING | Age: 47
Discharge: HOME | End: 2022-04-05
Attending: NURSE PRACTITIONER

## 2022-04-05 DIAGNOSIS — Z87.440 HISTORY OF URINARY INFECTION: ICD-10-CM

## 2022-06-08 RX ORDER — MULTIVITAMIN,THERAPEUTIC
1 TABLET ORAL DAILY
COMMUNITY

## 2022-06-09 ENCOUNTER — ANESTHESIA EVENT (OUTPATIENT)
Dept: SURGERY | Age: 47
End: 2022-06-09
Payer: COMMERCIAL

## 2022-06-09 NOTE — ANESTHESIA PRE PROCEDURE
Department of Anesthesiology  Preprocedure Note       Name:  Adelfo Chacon   Age:  41 y.o.  :  1975                                          MRN:  [id number]         Date:  2022      Surgeon: Tristen Lopez):  Mannie Pérez MD    Procedure: Procedure(s):  CYSTOSCOPY  WITHN HYDRODISTENTION    Medications prior to admission:   Prior to Admission medications    Medication Sig Start Date End Date Taking? Authorizing Provider   Probiotic Product (PROBIOTIC BLEND PO) Take 1 tablet by mouth daily Gummy    Historical Provider, MD   Multiple Vitamin (THERAPEUTIC) TABS tablet Take 1 tablet by mouth daily    Historical Provider, MD   acetaminophen (TYLENOL) 500 MG tablet Take by mouth every 6 hours as needed    Ar Automatic Reconciliation   drospirenone-ethinyl estradiol (DEJUAN) 3-0.02 MG per tablet Take 1 tablet by mouth daily 21   Ar Automatic Reconciliation       Current medications:    No current facility-administered medications for this encounter. Current Outpatient Medications   Medication Sig Dispense Refill    Probiotic Product (PROBIOTIC BLEND PO) Take 1 tablet by mouth daily Gummy      Multiple Vitamin (THERAPEUTIC) TABS tablet Take 1 tablet by mouth daily      acetaminophen (TYLENOL) 500 MG tablet Take by mouth every 6 hours as needed      drospirenone-ethinyl estradiol (DEJUAN) 3-0.02 MG per tablet Take 1 tablet by mouth daily         Allergies:     Allergies   Allergen Reactions    Azithromycin Hives and Other (See Comments)     Abdominal pain    Sulfa Antibiotics Hives, Nausea And Vomiting and Rash     Fever       Problem List:    Patient Active Problem List   Diagnosis Code    Pelvic pain R10.2    Adenomyosis N80.0    Pelvic peritoneal endometriosis N80.3       Past Medical History:        Diagnosis Date    Abnormal Papanicolaou smear of cervix     Anemia     no treatment currently     Endometriosis     History of 2019 novel coronavirus disease (COVID-19) 2022    denies hospitalization    History of pneumonia 2014    x 3 total--- last dx 2014    PONV (postoperative nausea and vomiting)     multiple episodes, SEVERE with 2021 Pelvic lap    Ruptured uterus during labor     after a v-kristy attempt    Uterine polyp        Past Surgical History:        Procedure Laterality Date     SECTION      x2    CHOLECYSTECTOMY, LAPAROSCOPIC  2019    DILATION AND CURETTAGE OF UTERUS      PELVIC LAPAROSCOPY  2021    4 Pelvic Laps, last 2021- endometriosis       Social History:    Social History     Tobacco Use    Smoking status: Never Smoker    Smokeless tobacco: Never Used   Substance Use Topics    Alcohol use: Yes     Comment: 2 drinks/month                                Counseling given: Not Answered      Vital Signs (Current):   Vitals:    22 0827   Weight: 140 lb (63.5 kg)   Height: 5' 4\" (1.626 m)                                              BP Readings from Last 3 Encounters:   21 110/70   21 112/68   21 100/60       NPO Status:                                                                                 BMI:   Wt Readings from Last 3 Encounters:   21 147 lb 9.6 oz (67 kg)   21 148 lb (67.1 kg)   21 148 lb 12.8 oz (67.5 kg)     Body mass index is 24.03 kg/m². CBC:   Lab Results   Component Value Date    WBC 6.3 2021    RBC 4.19 2021    HGB 13.0 2021    HCT 37.8 2021    MCV 90.2 2021    RDW 13.3 2021     2021       CMP: No results found for: NA, K, CL, CO2, BUN, CREATININE, GFRAA, AGRATIO, LABGLOM, GLUCOSE, GLU, PROT, CALCIUM, BILITOT, ALKPHOS, AST, ALT    POC Tests: No results for input(s): POCGLU, POCNA, POCK, POCCL, POCBUN, POCHEMO, POCHCT in the last 72 hours.     Coags: No results found for: PROTIME, INR, APTT    HCG (If Applicable): No results found for: PREGTESTUR, PREGSERUM, HCG, HCGQUANT     ABGs: No results found for: PHART, PO2ART, ARY8GCF, RTP9SBK, BEART, Y7RJPWGS     Type & Screen (If Applicable):  No results found for: LABABO, LABRH    Drug/Infectious Status (If Applicable):  No results found for: HIV, HEPCAB    COVID-19 Screening (If Applicable):   Lab Results   Component Value Date    COVID19 Not Detected 11/26/2021    COVID19 Performed 11/26/2021           Anesthesia Evaluation  Patient summary reviewed   history of anesthetic complications: PONV. Airway: Mallampati: II  TM distance: >3 FB   Neck ROM: full  Mouth opening: > = 3 FB   Dental: normal exam         Pulmonary: breath sounds clear to auscultation                             Cardiovascular:                      Neuro/Psych:               GI/Hepatic/Renal:            ROS comment: Anemia. Endo/Other:                     Abdominal:             Vascular: Other Findings:           Anesthesia Plan      general     ASA 1             Anesthetic plan and risks discussed with patient.                         Karla Sewell MD   6/9/2022

## 2022-06-09 NOTE — PERIOP NOTE
Phone pre-assessment completed. Verified name&  . Order to obtain consent not found in EHR, however patient verifies case posting. Type 1B surgery,  assessment complete. Orders not received. Labs per surgeon: unknown  Labs per anesthesia protocol: hgb on arrival- signed and held. 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 Medical/surgical history questions answered at their best of ability. All prior to admission medications reviewed and documented in [facility]. Instructed to take ONLY THE FOLLOWING MEDICATIONS ON THE DAY OF SURGERY according to anesthesia guidelines with sips of water: none . VERBALIZES UNDERSTANDING TO HOLD ALL VITAMINS AND SUPPLEMENTS and NSAIDS (aspirin, ibuprofen, naproxen) IMMEDIATELY PER ANESTHESIA PROTOCOL. Instructed on the following:    > Arrive at DT MAIN Entrance @ [address].  > Time of arrival to be called the day before by 1700  > NPO after midnight including gum, mints, and ice chips  > Responsible adult must drive patient to the hospital, stay during surgery, and patient will need supervision 24 hours after anesthesia  > Use antibacterial soap in shower the night before surgery and on the morning of surgery  > All piercings must be removed prior to arrival.    > Leave all valuables (money and jewelry) at home but bring insurance card and ID on DOS.   > You may be required to pay a deductible or co-pay on the day of your procedure. You can pre-pay by calling [phone] if your surgery is at the [facility] or [phone] if your surgery is at the [facility]. > Do not wear make-up, nail polish, lotions, cologne, perfumes, powders, or oil on skin. Artificial nails are not permitted. Teach back successful and demonstrates knowledge of instruction. If you do not receive a call with your arrival time by 5pm the business day prior to surgery, please call [phone].

## 2022-06-10 ENCOUNTER — ANESTHESIA (OUTPATIENT)
Dept: SURGERY | Age: 47
End: 2022-06-10
Payer: COMMERCIAL

## 2022-06-10 ENCOUNTER — HOSPITAL ENCOUNTER (OUTPATIENT)
Age: 47
Setting detail: OUTPATIENT SURGERY
Discharge: HOME | End: 2022-06-10
Attending: UROLOGY | Admitting: UROLOGY
Payer: COMMERCIAL

## 2022-06-10 VITALS
RESPIRATION RATE: 16 BRPM | TEMPERATURE: 97.8 F | SYSTOLIC BLOOD PRESSURE: 111 MMHG | OXYGEN SATURATION: 98 % | WEIGHT: 141.4 LBS | HEART RATE: 77 BPM | BODY MASS INDEX: 24.14 KG/M2 | DIASTOLIC BLOOD PRESSURE: 74 MMHG | HEIGHT: 64 IN

## 2022-06-10 DIAGNOSIS — R10.2 PELVIC PAIN: Primary | ICD-10-CM

## 2022-06-10 LAB — HCG UR QL: NEGATIVE

## 2022-06-10 PROCEDURE — 7100000000 HC PACU RECOVERY - FIRST 15 MIN: Performed by: UROLOGY

## 2022-06-10 PROCEDURE — 2709999900 HC NON-CHARGEABLE SUPPLY: Performed by: UROLOGY

## 2022-06-10 PROCEDURE — 81025 URINE PREGNANCY TEST: CPT

## 2022-06-10 PROCEDURE — 7100000010 HC PHASE II RECOVERY - FIRST 15 MIN: Performed by: UROLOGY

## 2022-06-10 PROCEDURE — 3600000002 HC SURGERY LEVEL 2 BASE: Performed by: UROLOGY

## 2022-06-10 PROCEDURE — 2500000003 HC RX 250 WO HCPCS: Performed by: UROLOGY

## 2022-06-10 PROCEDURE — 7100000011 HC PHASE II RECOVERY - ADDTL 15 MIN: Performed by: UROLOGY

## 2022-06-10 PROCEDURE — 2580000003 HC RX 258: Performed by: ANESTHESIOLOGY

## 2022-06-10 PROCEDURE — 7100000001 HC PACU RECOVERY - ADDTL 15 MIN: Performed by: UROLOGY

## 2022-06-10 PROCEDURE — 3700000000 HC ANESTHESIA ATTENDED CARE: Performed by: UROLOGY

## 2022-06-10 PROCEDURE — 6370000000 HC RX 637 (ALT 250 FOR IP): Performed by: ANESTHESIOLOGY

## 2022-06-10 PROCEDURE — 52260 CYSTOSCOPY AND TREATMENT: CPT | Performed by: UROLOGY

## 2022-06-10 PROCEDURE — 2500000003 HC RX 250 WO HCPCS: Performed by: NURSE ANESTHETIST, CERTIFIED REGISTERED

## 2022-06-10 PROCEDURE — 3700000001 HC ADD 15 MINUTES (ANESTHESIA): Performed by: UROLOGY

## 2022-06-10 PROCEDURE — 6360000002 HC RX W HCPCS: Performed by: UROLOGY

## 2022-06-10 PROCEDURE — 3600000012 HC SURGERY LEVEL 2 ADDTL 15MIN: Performed by: UROLOGY

## 2022-06-10 PROCEDURE — 6360000002 HC RX W HCPCS: Performed by: ANESTHESIOLOGY

## 2022-06-10 PROCEDURE — 6360000002 HC RX W HCPCS: Performed by: NURSE ANESTHETIST, CERTIFIED REGISTERED

## 2022-06-10 RX ORDER — APREPITANT 40 MG/1
40 CAPSULE ORAL ONCE
Status: COMPLETED | OUTPATIENT
Start: 2022-06-10 | End: 2022-06-10

## 2022-06-10 RX ORDER — SODIUM CHLORIDE, SODIUM LACTATE, POTASSIUM CHLORIDE, CALCIUM CHLORIDE 600; 310; 30; 20 MG/100ML; MG/100ML; MG/100ML; MG/100ML
INJECTION, SOLUTION INTRAVENOUS CONTINUOUS
Status: DISCONTINUED | OUTPATIENT
Start: 2022-06-10 | End: 2022-06-10 | Stop reason: HOSPADM

## 2022-06-10 RX ORDER — SODIUM CHLORIDE 9 MG/ML
INJECTION, SOLUTION INTRAVENOUS CONTINUOUS
Status: DISCONTINUED | OUTPATIENT
Start: 2022-06-10 | End: 2022-06-10 | Stop reason: HOSPADM

## 2022-06-10 RX ORDER — HYDROCODONE BITARTRATE AND ACETAMINOPHEN 10; 325 MG/1; MG/1
1 TABLET ORAL EVERY 4 HOURS PRN
Qty: 20 TABLET | Refills: 0 | Status: SHIPPED | OUTPATIENT
Start: 2022-06-10 | End: 2022-07-10

## 2022-06-10 RX ORDER — DIPHENHYDRAMINE HYDROCHLORIDE 50 MG/ML
12.5 INJECTION INTRAMUSCULAR; INTRAVENOUS
Status: DISCONTINUED | OUTPATIENT
Start: 2022-06-10 | End: 2022-06-10 | Stop reason: HOSPADM

## 2022-06-10 RX ORDER — SODIUM CHLORIDE 9 MG/ML
INJECTION, SOLUTION INTRAVENOUS PRN
Status: DISCONTINUED | OUTPATIENT
Start: 2022-06-10 | End: 2022-06-10 | Stop reason: HOSPADM

## 2022-06-10 RX ORDER — MIDAZOLAM HYDROCHLORIDE 2 MG/2ML
2 INJECTION, SOLUTION INTRAMUSCULAR; INTRAVENOUS
Status: DISCONTINUED | OUTPATIENT
Start: 2022-06-10 | End: 2022-06-10 | Stop reason: HOSPADM

## 2022-06-10 RX ORDER — DEXAMETHASONE SODIUM PHOSPHATE 4 MG/ML
INJECTION, SOLUTION INTRA-ARTICULAR; INTRALESIONAL; INTRAMUSCULAR; INTRAVENOUS; SOFT TISSUE PRN
Status: DISCONTINUED | OUTPATIENT
Start: 2022-06-10 | End: 2022-06-10 | Stop reason: SDUPTHER

## 2022-06-10 RX ORDER — PROPOFOL 10 MG/ML
INJECTION, EMULSION INTRAVENOUS PRN
Status: DISCONTINUED | OUTPATIENT
Start: 2022-06-10 | End: 2022-06-10 | Stop reason: SDUPTHER

## 2022-06-10 RX ORDER — OXYCODONE HYDROCHLORIDE 5 MG/1
5 TABLET ORAL
Status: COMPLETED | OUTPATIENT
Start: 2022-06-10 | End: 2022-06-10

## 2022-06-10 RX ORDER — SODIUM CHLORIDE 0.9 % (FLUSH) 0.9 %
5-40 SYRINGE (ML) INJECTION EVERY 12 HOURS SCHEDULED
Status: DISCONTINUED | OUTPATIENT
Start: 2022-06-10 | End: 2022-06-10 | Stop reason: HOSPADM

## 2022-06-10 RX ORDER — LIDOCAINE HYDROCHLORIDE 20 MG/ML
INJECTION, SOLUTION EPIDURAL; INFILTRATION; INTRACAUDAL; PERINEURAL PRN
Status: DISCONTINUED | OUTPATIENT
Start: 2022-06-10 | End: 2022-06-10 | Stop reason: SDUPTHER

## 2022-06-10 RX ORDER — HYDROMORPHONE HYDROCHLORIDE 2 MG/ML
0.25 INJECTION, SOLUTION INTRAMUSCULAR; INTRAVENOUS; SUBCUTANEOUS EVERY 5 MIN PRN
Status: DISCONTINUED | OUTPATIENT
Start: 2022-06-10 | End: 2022-06-10 | Stop reason: HOSPADM

## 2022-06-10 RX ORDER — ONDANSETRON 2 MG/ML
INJECTION INTRAMUSCULAR; INTRAVENOUS PRN
Status: DISCONTINUED | OUTPATIENT
Start: 2022-06-10 | End: 2022-06-10 | Stop reason: SDUPTHER

## 2022-06-10 RX ORDER — SODIUM CHLORIDE 0.9 % (FLUSH) 0.9 %
5-40 SYRINGE (ML) INJECTION PRN
Status: DISCONTINUED | OUTPATIENT
Start: 2022-06-10 | End: 2022-06-10 | Stop reason: HOSPADM

## 2022-06-10 RX ORDER — ONDANSETRON 2 MG/ML
4 INJECTION INTRAMUSCULAR; INTRAVENOUS
Status: DISCONTINUED | OUTPATIENT
Start: 2022-06-10 | End: 2022-06-10 | Stop reason: HOSPADM

## 2022-06-10 RX ORDER — PHENAZOPYRIDINE HYDROCHLORIDE 200 MG/1
200 TABLET, FILM COATED ORAL 3 TIMES DAILY PRN
Qty: 12 TABLET | Refills: 2 | Status: SHIPPED | OUTPATIENT
Start: 2022-06-10 | End: 2022-06-13

## 2022-06-10 RX ORDER — MIDAZOLAM HYDROCHLORIDE 1 MG/ML
INJECTION INTRAMUSCULAR; INTRAVENOUS PRN
Status: DISCONTINUED | OUTPATIENT
Start: 2022-06-10 | End: 2022-06-10 | Stop reason: SDUPTHER

## 2022-06-10 RX ORDER — LIDOCAINE HYDROCHLORIDE 10 MG/ML
1 INJECTION, SOLUTION INFILTRATION; PERINEURAL
Status: DISCONTINUED | OUTPATIENT
Start: 2022-06-10 | End: 2022-06-10 | Stop reason: HOSPADM

## 2022-06-10 RX ORDER — HYDROMORPHONE HYDROCHLORIDE 2 MG/ML
0.5 INJECTION, SOLUTION INTRAMUSCULAR; INTRAVENOUS; SUBCUTANEOUS EVERY 5 MIN PRN
Status: DISCONTINUED | OUTPATIENT
Start: 2022-06-10 | End: 2022-06-10 | Stop reason: HOSPADM

## 2022-06-10 RX ORDER — BUPIVACAINE HYDROCHLORIDE 5 MG/ML
INJECTION, SOLUTION EPIDURAL; INTRACAUDAL PRN
Status: DISCONTINUED | OUTPATIENT
Start: 2022-06-10 | End: 2022-06-10 | Stop reason: ALTCHOICE

## 2022-06-10 RX ORDER — LIDOCAINE HYDROCHLORIDE 10 MG/ML
1 INJECTION, SOLUTION EPIDURAL; INFILTRATION; INTRACAUDAL; PERINEURAL
Status: DISCONTINUED | OUTPATIENT
Start: 2022-06-10 | End: 2022-06-10 | Stop reason: HOSPADM

## 2022-06-10 RX ORDER — PROCHLORPERAZINE EDISYLATE 5 MG/ML
5 INJECTION INTRAMUSCULAR; INTRAVENOUS
Status: DISCONTINUED | OUTPATIENT
Start: 2022-06-10 | End: 2022-06-10 | Stop reason: HOSPADM

## 2022-06-10 RX ADMIN — APREPITANT 40 MG: 40 CAPSULE ORAL at 07:44

## 2022-06-10 RX ADMIN — OXYCODONE 5 MG: 5 TABLET ORAL at 09:18

## 2022-06-10 RX ADMIN — ONDANSETRON 4 MG: 2 INJECTION INTRAMUSCULAR; INTRAVENOUS at 08:26

## 2022-06-10 RX ADMIN — PROPOFOL 200 MG: 10 INJECTION, EMULSION INTRAVENOUS at 08:17

## 2022-06-10 RX ADMIN — SODIUM CHLORIDE, POTASSIUM CHLORIDE, SODIUM LACTATE AND CALCIUM CHLORIDE: 600; 310; 30; 20 INJECTION, SOLUTION INTRAVENOUS at 06:41

## 2022-06-10 RX ADMIN — MIDAZOLAM 2 MG: 1 INJECTION INTRAMUSCULAR; INTRAVENOUS at 08:14

## 2022-06-10 RX ADMIN — LIDOCAINE HYDROCHLORIDE 80 MG: 20 INJECTION, SOLUTION EPIDURAL; INFILTRATION; INTRACAUDAL; PERINEURAL at 08:17

## 2022-06-10 RX ADMIN — Medication 2000 MG: at 08:21

## 2022-06-10 RX ADMIN — DEXAMETHASONE SODIUM PHOSPHATE 4 MG: 4 INJECTION, SOLUTION INTRAMUSCULAR; INTRAVENOUS at 08:26

## 2022-06-10 ASSESSMENT — PAIN DESCRIPTION - LOCATION
LOCATION: ABDOMEN;GENERALIZED
LOCATION: GENERALIZED

## 2022-06-10 ASSESSMENT — PAIN SCALES - GENERAL
PAINLEVEL_OUTOF10: 4
PAINLEVEL_OUTOF10: 3

## 2022-06-10 ASSESSMENT — PAIN - FUNCTIONAL ASSESSMENT
PAIN_FUNCTIONAL_ASSESSMENT: 0-10
PAIN_FUNCTIONAL_ASSESSMENT: FACE, LEGS, ACTIVITY, CRY, AND CONSOLABILITY (FLACC)

## 2022-06-10 ASSESSMENT — PAIN DESCRIPTION - DESCRIPTORS
DESCRIPTORS: ACHING
DESCRIPTORS: ACHING

## 2022-06-10 NOTE — BRIEF OP NOTE
Brief Postoperative Note      Patient: Jose Juan King  YOB: 1975  MRN: [id number]    Date of Procedure: 6/10/2022    Pre-Op Diagnosis: Dysuria [R30.0]  Urinary frequency [R35.0]    Post-Op Diagnosis: IC       Procedure(s):  CYSTOSCOPY  WITHN HYDRODISTENTION    Surgeon(s):  Joann Wong MD    Anesthesia: General    Estimated Blood Loss (mL): Minimal    Complications: None    Specimens: None     Drains: None    Electronically signed by Ayah Shaikh MD on 6/10/2022 at 9:23 AM

## 2022-06-10 NOTE — OP NOTE
[facility]  OPERATIVE REPORT    Name:  Ann Gongora  MR#:  [id number]  :  1975  ACCOUNT #:  [de-identified]  DATE OF SERVICE:  06/10/2022    PREOPERATIVE DIAGNOSIS:  Dysuria and pelvic pain. POSTOPERATIVE DIAGNOSIS:  Interstitial cystitis. PROCEDURE PERFORMED:  Cystoscopy with hydrodistention. SURGEON:  Rios Sanches. Marychuy Silveira MD    ASSISTANT: None    ANESTHESIA:  General.    COMPLICATIONS:  None. SPECIMENS REMOVED:  None. ESTIMATED BLOOD LOSS:  Minimal.    DRAINS:  None. NARRATIVE:  The patient was taken to the OR and after adequate general anesthesia was achieved, she was placed in dorsal lithotomy position and prepped and draped in usual sterile fashion for a cystoscopy case. A preliminary cystourethroscopy was performed with a 22-Turkmen cystoscope. This revealed a normal urethra. Once within the bladder, there was no mucosal lesion noted. There were no trabeculations or cellule formation. Both ureteral orifices were normal in size, shape, and position. There was clear reflux of urine bilaterally. With the bag of fluid approximately 30 inches above the pubic symphysis, hydrodistention was performed for approximately 12 minutes. Upon drainage, it was noted that bladder capacity was about 750 mL. There were significant areas of submucosal hemorrhage. Representative photos were taken. At that point, 30 mL of 0.5% Marcaine were instilled. All instruments were removed. The patient was taken down out of dorsal lithotomy position, awakened, extubated and taken from the OR without any further incident or complaint.       Kathy Ritter MD      [id number]  D:  06/10/2022 9:38  T:  06/10/2022 12:42  JOB #:  [id number]

## 2022-06-10 NOTE — H&P
HISTORY AND PHYSICAL             Date: 6/10/2022        Patient Name: Patrick Soares     YOB: 1975      Age:  41 y.o. History of Present Illness    Returns to us for follow-up of voiding symptoms. Patient reports she does not feel that the antibiotic made that big of a difference. She continues to wake up in the morning with pain in the bladder. She also has found that alcohol and caffeine trigger the bladder issues. I had mentioned proceeding with cystoscopy hydrodistention for symptoms did not improve.   She feels that she would like to proceed with further testing.     Initially she was referred to us today by primary care due to ongoing urinary symptoms.  Patient reports that she has had numerous episodes with what feels like a urinary infection.  The multiple times that she has been to the primary care physician the urine cultures however have shown no growth.  The last episode she experienced had a good bit of pain and she actually had gross blood.  She has urinary frequency and occasional dysuria. Rudy Torres is also some low back pain.  She does have high sense of urgency and feeling of incomplete emptying but she denies any significant urinary infections.     Patient tells me when she was in her 25s that she had a lot of UTIs.  She remembers having some sort of scan and catheter put in her bladder but nothing was ever found.  She has had no recent imaging of her kidneys.  She denies fever or chills.         Past Medical History     Past Medical History:   Diagnosis Date    Abnormal Papanicolaou smear of cervix     Anemia     no treatment currently     Endometriosis     History of 2019 novel coronavirus disease (COVID-19) 01/03/2022    denies hospitalization    History of pneumonia 2014    x 3 total--- last dx 2014    PONV (postoperative nausea and vomiting)     multiple episodes, SEVERE with 12/2021 Pelvic lap    Ruptured uterus during labor     after a v-kristy attempt    Uterine polyp         Past Surgical History     Past Surgical History:   Procedure Laterality Date     SECTION      x2    CHOLECYSTECTOMY, LAPAROSCOPIC  2019    DILATION AND CURETTAGE OF UTERUS      PELVIC LAPAROSCOPY  2021    4 Pelvic Laps, last 2021- endometriosis        Medications Prior to Admission     Prior to Admission medications    Medication Sig Start Date End Date Taking? Authorizing Provider   Probiotic Product (PROBIOTIC BLEND PO) Take 1 tablet by mouth daily Gummy    Historical Provider, MD   Multiple Vitamin (THERAPEUTIC) TABS tablet Take 1 tablet by mouth daily    Historical Provider, MD   acetaminophen (TYLENOL) 500 MG tablet Take by mouth every 6 hours as needed    Ar Automatic Reconciliation   drospirenone-ethinyl estradiol (DEJUAN) 3-0.02 MG per tablet Take 1 tablet by mouth daily 21   Ar Automatic Reconciliation        Allergies   Azithromycin and Sulfa antibiotics    Social History     Social History     Socioeconomic History    Marital status:      Spouse name: None    Number of children: None    Years of education: None    Highest education level: None   Occupational History    None   Tobacco Use    Smoking status: Never Smoker    Smokeless tobacco: Never Used   Vaping Use    Vaping Use: Never used   Substance and Sexual Activity    Alcohol use: Yes     Comment: 2 drinks/month    Drug use: No    Sexual activity: None   Other Topics Concern    None   Social History Narrative    None     Social Determinants of Health     Financial Resource Strain:     Difficulty of Paying Living Expenses: Not on file   Food Insecurity:     Worried About Running Out of Food in the Last Year: Not on file    Maria T of Food in the Last Year: Not on file   Transportation Needs:     Lack of Transportation (Medical): Not on file    Lack of Transportation (Non-Medical):  Not on file   Physical Activity:     Days of Exercise per Week: Not on file    Minutes of Exercise per Session: Not on file   Stress:     Feeling of Stress : Not on file   Social Connections:     Frequency of Communication with Friends and Family: Not on file    Frequency of Social Gatherings with Friends and Family: Not on file    Attends Druze Services: Not on file    Active Member of [address] [place] [address] [employer] or Organizations: Not on file    Attends Club or Organization Meetings: Not on file    Marital Status: Not on file   Intimate Partner Violence:     Fear of Current or Ex-Partner: Not on file    Emotionally Abused: Not on file    Physically Abused: Not on file    Sexually Abused: Not on file   Housing Stability:     Unable to Pay for Housing in the Last Year: Not on file    Number of [place] in the Last Year: Not on file    Unstable Housing in the Last Year: Not on file          Family History     Family History   Problem Relation Age of Onset    Heart Disease Father     Other Mother         Large intestine removed.  Hypertension Mother     Endometriosis Paternal Aunt         Several Paternal Aunts.  Endometriosis Sister         Hysterectomy at 29    No Known Problems Paternal Grandmother     No Known Problems Maternal Grandfather     No Known Problems Maternal Grandmother     Other Child         hereditary spherecitosis     No Known Problems Other         Son (9years old) was dx in May with Hereditary Spherocytosis.  Other Brother         Premature birth- Lived 4 days    Hypertension Father     Heart Attack Father         Four Heart Attacks    Diabetes Paternal Grandfather     Breast Cancer Neg Hx     Heart Attack Paternal Grandfather     Heart Disease Paternal Grandfather        Review of Systems   Review of Systems    Physical Exam   BP (!) 145/84   Pulse 75   Temp 98.1 °F (36.7 °C) (Oral)   Resp 18   Ht 5' 4\" (1.626 m)   Wt 141 lb 6.4 oz (64.1 kg)   LMP 05/30/2022   SpO2 100%   BMI 24.27 kg/m²     Physical Exam     RRR/CTA.  Abdomen:Soft, non-tender, active bowel sounds,Bladder is not palpable. Labs      Recent Results (from the past 24 hour(s))   POC Pregnancy Urine Qual    Collection Time: 06/10/22  6:13 AM   Result Value Ref Range    Preg Test, Ur Negative NEG          Imaging/Diagnostics Last 24 Hours   No results found.     Assessment     Possible IC    Plan     Hydrodistention

## 2022-06-10 NOTE — ANESTHESIA POSTPROCEDURE EVALUATION
Department of Anesthesiology  Postprocedure Note    Patient: Winter Pimentel  MRN: [id number]  YOB: 1975  Date of evaluation: 6/10/2022  Time:  11:35 AM     Procedure Summary     Date: 06/10/22 Room / Location: [facility] MAIN OR 01 CYSTO / [facility] MAIN OR    Anesthesia Start: 0812 Anesthesia Stop: Samantha Oneil    Procedure: CYSTOSCOPY  WITHN HYDRODISTENTION (N/A Bladder) Diagnosis:       Dysuria      Urinary frequency      (Dysuria [R30.0])      (Urinary frequency [R35.0])    Providers: Gia Herrera MD Responsible Provider: Jennifer Wasserman MD    Anesthesia Type: General ASA Status: 1          Anesthesia Type: General    Yue Phase I: Yue Score: 8    Yue Phase II: Yue Score: 10    Last vitals: Reviewed and per EMR flowsheets.        Anesthesia Post Evaluation    Patient location during evaluation: PACU  Patient participation: complete - patient participated  Level of consciousness: awake  Airway patency: patent  Nausea & Vomiting: no nausea  Complications: no  Cardiovascular status: blood pressure returned to baseline and hemodynamically stable  Respiratory status: acceptable  Hydration status: stable  Multimodal analgesia pain management approach

## 2022-06-30 ENCOUNTER — OFFICE VISIT (OUTPATIENT)
Dept: UROLOGY | Age: 47
End: 2022-06-30
Payer: COMMERCIAL

## 2022-06-30 DIAGNOSIS — N30.10 IC (INTERSTITIAL CYSTITIS): Primary | ICD-10-CM

## 2022-06-30 DIAGNOSIS — R30.0 DYSURIA: ICD-10-CM

## 2022-06-30 LAB
BILIRUBIN, URINE, POC: NEGATIVE
BLOOD URINE, POC: NORMAL
GLUCOSE URINE, POC: NEGATIVE
KETONES, URINE, POC: NEGATIVE
LEUKOCYTE ESTERASE, URINE, POC: NEGATIVE
NITRITE, URINE, POC: NEGATIVE
PH, URINE, POC: 5.5 (ref 4.6–8)
PROTEIN,URINE, POC: NEGATIVE
SPECIFIC GRAVITY, URINE, POC: 1.02 (ref 1–1.03)
URINALYSIS CLARITY, POC: NORMAL
URINALYSIS COLOR, POC: NORMAL
UROBILINOGEN, POC: NORMAL

## 2022-06-30 PROCEDURE — 81003 URINALYSIS AUTO W/O SCOPE: CPT | Performed by: NURSE PRACTITIONER

## 2022-06-30 PROCEDURE — 99214 OFFICE O/P EST MOD 30 MIN: CPT | Performed by: NURSE PRACTITIONER

## 2022-06-30 RX ORDER — AMITRIPTYLINE HYDROCHLORIDE 10 MG/1
10 TABLET, FILM COATED ORAL NIGHTLY
Qty: 90 TABLET | Refills: 3 | Status: SHIPPED | OUTPATIENT
Start: 2022-06-30

## 2022-06-30 ASSESSMENT — ENCOUNTER SYMPTOMS
NAUSEA: 0
BACK PAIN: 0

## 2022-06-30 NOTE — PROGRESS NOTES
[facility] [address] [place] [address], [facility] [address]  [phone]          Marquez Talamantes  : 1975    Chief Complaint   Patient presents with    Follow-up          HPI     Marquez Talamantes is a 41 y.o. female    Returns today for follow-up on cystoscopy hydrodistention. At the time of the hydrodistention interstitial cystitis was seen. She had glomerulations throughout the bladder. Her bladder capacity was 750 mL. She is back today for follow-up. Patient tells me that she has some slight irritation at the urethra. She is not having the burning or stinging like she typically does when she feels infection is present. The symptoms have somewhat improved. Her urine today is clear. Back today to discuss findings and also any treatment options. Initially she was referred to us today by primary care due to ongoing urinary symptoms.  Patient reports that she has had numerous episodes with what feels like a urinary infection.  The multiple times that she has been to the primary care physician the urine cultures however have shown no growth.  The last episode she experienced had a good bit of pain and she actually had gross blood.  She has urinary frequency and occasional dysuria. Orvilla Leaven is also some low back pain.  She does have high sense of urgency and feeling of incomplete emptying but she denies any significant urinary infections.     Patient tells me when she was in her 25s that she had a lot of UTIs.  She remembers having some sort of scan and catheter put in her bladder but nothing was ever found.  She has had no recent imaging of her kidneys.  She denies fever or chills.  Her urine today is clear.     She has significant history of herbal bowel syndrome and also endometriosis.  Recently underwent an exploratory laparoscopy for endometriosis.  History of 2 pregnancies with 2  deliveries.     Past Medical History:   Diagnosis Date    Abnormal Papanicolaou smear of cervix     Anemia     no treatment currently     Endometriosis     History of 2019 novel coronavirus disease (COVID-19) 2022    denies hospitalization    History of pneumonia 2014    x 3 total--- last dx 2014    PONV (postoperative nausea and vomiting)     multiple episodes, SEVERE with 2021 Pelvic lap    Ruptured uterus during labor     after a v-bac attempt    Uterine polyp      Past Surgical History:   Procedure Laterality Date     SECTION      x2    CHOLECYSTECTOMY, LAPAROSCOPIC  2019    CYSTOSCOPY N/A 6/10/2022    CYSTOSCOPY  WITHN HYDRODISTENTION performed by González Freedman MD at [address] [place] [address]  2021    4 Pelvic Laps, last 2021- endometriosis     Current Outpatient Medications   Medication Sig Dispense Refill    amitriptyline (ELAVIL) 10 MG tablet Take 1 tablet by mouth nightly 90 tablet 3    HYDROcodone-acetaminophen (NORCO)  MG per tablet Take 1 tablet by mouth every 4 hours as needed for Pain for up to 30 days. Intended supply: 30 days 20 tablet 0    Probiotic Product (PROBIOTIC BLEND PO) Take 1 tablet by mouth daily Gummy      Multiple Vitamin (THERAPEUTIC) TABS tablet Take 1 tablet by mouth daily      acetaminophen (TYLENOL) 500 MG tablet Take by mouth every 6 hours as needed      drospirenone-ethinyl estradiol (DEJUAN) 3-0.02 MG per tablet Take 1 tablet by mouth daily       No current facility-administered medications for this visit.      Allergies   Allergen Reactions    Azithromycin Hives and Other (See Comments)     Abdominal pain    Sulfa Antibiotics Hives, Nausea And Vomiting and Rash     Fever     Social History     Socioeconomic History    Marital status:      Spouse name: Not on file    Number of children: Not on file    Years of education: Not on file    Highest education level: Not on file   Occupational History    Not on file   Tobacco Use    Smoking status: Never Smoker    Smokeless tobacco: Never Used   Vaping Use    Vaping Use: Never used   Substance and Sexual Activity    Alcohol use: Yes     Comment: 2 drinks/month    Drug use: No    Sexual activity: Not on file   Other Topics Concern    Not on file   Social History Narrative    Not on file     Social Determinants of Health     Financial Resource Strain:     Difficulty of Paying Living Expenses: Not on file   Food Insecurity:     Worried About Running Out of Food in the Last Year: Not on file    Maria T of Food in the Last Year: Not on file   Transportation Needs:     Lack of Transportation (Medical): Not on file    Lack of Transportation (Non-Medical): Not on file   Physical Activity:     Days of Exercise per Week: Not on file    Minutes of Exercise per Session: Not on file   Stress:     Feeling of Stress : Not on file   Social Connections:     Frequency of Communication with Friends and Family: Not on file    Frequency of Social Gatherings with Friends and Family: Not on file    Attends Christian Services: Not on file    Active Member of [address] [place] [address] or Organizations: Not on file    Attends Club or Organization Meetings: Not on file    Marital Status: Not on file   Intimate Partner Violence:     Fear of Current or Ex-Partner: Not on file    Emotionally Abused: Not on file    Physically Abused: Not on file    Sexually Abused: Not on file   Housing Stability:     Unable to Pay for Housing in the Last Year: Not on file    Number of [place] in the Last Year: Not on file    Unstable Housing in the Last Year: Not on file     Family History   Problem Relation Age of Onset    Heart Disease Father     Other Mother         Large intestine removed.  Hypertension Mother     Endometriosis Paternal Aunt         Several Paternal Aunts.     Endometriosis Sister         Hysterectomy at 29    No Known Problems Paternal Grandmother     No Known Problems Maternal Grandfather     No Known Problems Maternal Grandmother     Other Child         hereditary spherecitosis     No Known Problems Other         Son (9years old) was dx in May with Hereditary Spherocytosis.  Other Brother         Premature birth- Lived 4 days    Hypertension Father     Heart Attack Father         Four Heart Attacks    Diabetes Paternal Grandfather     Breast Cancer Neg Hx     Heart Attack Paternal Grandfather     Heart Disease Paternal Grandfather        Review of Systems  Constitutional:   Negative for fever. GI:  Negative for nausea. Musculoskeletal:  Negative for back pain.       Urinalysis  UA - Dipstick  Results for orders placed or performed in visit on 06/30/22   AMB POC URINALYSIS DIP STICK AUTO W/O MICRO   Result Value Ref Range    Color (UA POC)      Clarity (UA POC)      Glucose, Urine, POC Negative Negative    Bilirubin, Urine, POC Negative Negative    KETONES, Urine, POC Negative Negative    Specific Gravity, Urine, POC 1.025 1.001 - 1.035    Blood (UA POC) Small Negative    pH, Urine, POC 5.5 4.6 - 8.0    Protein, Urine, POC Negative Negative    Urobilinogen, POC 0.2 mg/dL     Nitrite, Urine, POC Negative Negative    Leukocyte Esterase, Urine, POC Negative Negative   Results for orders placed or performed in visit on 11/11/21   AMB POC URINALYSIS DIP STICK AUTO W/O MICRO   Result Value Ref Range    Color (UA POC) Yellow     Clarity (UA POC) Clear     Glucose, Urine, POC Negative Negative    Bilirubin, Urine, POC Negative Negative    KETONES, Urine, POC Negative Negative    Specific Gravity, Urine, POC 1.030 1.001 - 1.035 NA    Blood (UA POC) Negative Negative    pH, Urine, POC 5.5 4.6 - 8.0 NA    Protein, Urine, POC Negative Negative    Urobilinogen, POC 0.2 mg/dL 0.2 - 1    Nitrite, Urine, POC Negative Negative    Leukocyte Esterase, Urine, POC Negative Negative       UA - Micro  WBC - 0  RBC - 0  Bacteria - 0  Epith - 0    PHYSICAL EXAM    GENERAL: No acute distress, Awake, Alert, Oriented X 3, Gait normal  ABDOMEN: soft, non tender, non-distended, positive bowel sounds, no organomegaly, no palpable masses, no guarding, no rebound tenderness  SKIN: No rash, no erythema, no lacerations or abrasions, no ecchymosis  MUSCULOSKELETAL - MAEW, no edema       Assessment and Plan    ICD-10-CM    1. IC (interstitial cystitis)  N30.10    2. Dysuria  R30.0 AMB POC URINALYSIS DIP STICK AUTO W/O MICRO     . The diagnosis of interstitial cystitis was discussed in detail. Today we distributed information and discussed information about what interstitial cystitis is, treatment options/step therapy,  discussed cystoscopy hydrodistention procedure and why it was done, and also gave out diet information. PLAN:  We will add amitriptyline 10 mg p.o. nightly  She will review all the IC information. Review in 8 weeks for recheck. Will answer any further questions at that time and reassess her symptoms. Thuan Morales, NP, APRN - NP  Dr. Yasmine Saravia is supervising physician today and he approves plan of care. Return in about 8 weeks (around 8/25/2022) for for recheck. Elements of this note have been dictated using speech recognition software. Although reviewed, errors of speech recognition may have occurred.

## 2022-11-21 RX ORDER — ETHINYL ESTRADIOL/DROSPIRENONE 0.02-3(28)
TABLET ORAL
Qty: 84 TABLET | Refills: 3 | OUTPATIENT
Start: 2022-11-21

## 2022-12-28 ENCOUNTER — OFFICE VISIT (OUTPATIENT)
Dept: OBGYN CLINIC | Age: 47
End: 2022-12-28
Payer: COMMERCIAL

## 2022-12-28 VITALS
SYSTOLIC BLOOD PRESSURE: 120 MMHG | WEIGHT: 137.6 LBS | BODY MASS INDEX: 23.49 KG/M2 | DIASTOLIC BLOOD PRESSURE: 80 MMHG | HEIGHT: 64 IN

## 2022-12-28 DIAGNOSIS — Z13.89 SCREENING FOR GENITOURINARY CONDITION: ICD-10-CM

## 2022-12-28 DIAGNOSIS — Z01.419 WELL WOMAN EXAM: Primary | ICD-10-CM

## 2022-12-28 DIAGNOSIS — Z12.31 ENCOUNTER FOR SCREENING MAMMOGRAM FOR BREAST CANCER: ICD-10-CM

## 2022-12-28 LAB
BILIRUBIN, URINE, POC: NEGATIVE
BLOOD URINE, POC: NEGATIVE
GLUCOSE URINE, POC: NEGATIVE
KETONES, URINE, POC: NEGATIVE
LEUKOCYTE ESTERASE, URINE, POC: NEGATIVE
NITRITE, URINE, POC: NEGATIVE
PH, URINE, POC: 5 (ref 4.6–8)
PROTEIN,URINE, POC: NEGATIVE
SPECIFIC GRAVITY, URINE, POC: 1 (ref 1–1.03)
URINALYSIS CLARITY, POC: CLEAR
URINALYSIS COLOR, POC: YELLOW
UROBILINOGEN, POC: NORMAL

## 2022-12-28 PROCEDURE — 81002 URINALYSIS NONAUTO W/O SCOPE: CPT | Performed by: OBSTETRICS & GYNECOLOGY

## 2022-12-28 PROCEDURE — 99396 PREV VISIT EST AGE 40-64: CPT | Performed by: OBSTETRICS & GYNECOLOGY

## 2022-12-28 RX ORDER — DROSPIRENONE AND ETHINYL ESTRADIOL 0.02-3(28)
1 KIT ORAL DAILY
Qty: 1 PACKET | Refills: 11 | Status: SHIPPED | OUTPATIENT
Start: 2022-12-28

## 2022-12-28 NOTE — PROGRESS NOTES
12/28/2022    [address]  1975  Age: 41 y.o. Patient's last menstrual period was 12/10/2022. [id number]  PCP: Mitzi Sahu MD  Patient does see them for regular preventative visits. HPI: Doing well with DEJUAN        No flowsheet data found. Allergies, medications, past medical and surgical history, social history, family history all reviewed. Review of Systems  Constitutional:  Denies unexplained weight loss or heat or cold intolerance  ENT: Denies change in vision, change in hearing, frequent headaches  Cardiovascular:  Denies chest pain, swelling in legs or feet, shortness of breath when lying flat  Respiratory:  Denies shortness of breath, cough greater than 2 weeks or coughing up blood  Gastro: Denies diarrhea greater than 2 weeks, rectal bleeding, bloody stools, heartburn, or constipation  :  Denies blood in urine, nocturia, dysuria or incontinence  Breast:  Denies nipple discharge, masses or pain  Skin:  Denies rash greater than 2 weeks, change in moles  Musculoskeletal/Neuro:  Denies joint pain, muscle weakness, seizures, loss of balance or frequent falls  Psych:  Denies frequent crying spells or severe anxiety  Heme:  Denies easy bruising, bleeding gums, frequent nosebleeds or swollen lymph nodes  GYN:  Denies bleeding or spotting between menses, heavy menses, menses longer than 7 days, severe menstrual cramps. Vitals:    12/28/22 1419   BP: 120/80   Weight: 137 lb 9.6 oz (62.4 kg)   Height: 5' 4\" (1.626 m)       Body mass index is 23.62 kg/m². Pt in no distress. Alert and oriented x3. Affect bright. Well developed, well nourished. HEENT: normocephalic, atraumatic. Sclerae nonicteric. Neck supple w/o thyromegaly. Trachea midline. Heart: regular rate and rhythm, no murmur or gallop  Lungs: clear to auscultation.  Respiratory effort normal.  Breasts: no masses or axillary lymphadenopathy  Abdomen: soft and nontender, no masses, no organomegaly, no ventral hernia  Pelvic: normal external genitalia, urethral meatus, urethra, vagina and cervix. Bimanual exam w/o masses or tenderness. Bladder nontender. Uterus normal size. Adnexae normal.  Perineum and anus normal. No hemorrhoids. Rectovaginal: no masses. Hemocult negative.     Patient Active Problem List   Diagnosis    Pelvic pain    Adenomyosis    Pelvic peritoneal endometriosis          Orders Placed This Encounter   Procedures    SKY AIDA DIGITAL SCREEN BILATERAL    AMB POC URINALYSIS DIP STICK MANUAL W/O MICRO          Pt counseled about routine screening recommendations      Stacy Barclay MD

## 2022-12-28 NOTE — PROGRESS NOTES
Doing well with Ximena and needs refills x 1 year. Last pap smear: 07/07/2021 Negative. HPV Negative. Mammogram: 07/24/2020 BD3.    Colonoscopy: screening at age 50  Dexa: Screening at age 74

## 2023-01-31 ENCOUNTER — OFFICE VISIT (OUTPATIENT)
Dept: OBGYN CLINIC | Age: 48
End: 2023-01-31

## 2023-01-31 VITALS
SYSTOLIC BLOOD PRESSURE: 120 MMHG | DIASTOLIC BLOOD PRESSURE: 74 MMHG | HEIGHT: 64 IN | WEIGHT: 138.8 LBS | BODY MASS INDEX: 23.7 KG/M2

## 2023-01-31 DIAGNOSIS — N76.0 ACUTE VAGINITIS: ICD-10-CM

## 2023-01-31 DIAGNOSIS — N90.89 VULVAR MASS: ICD-10-CM

## 2023-01-31 RX ORDER — CLOBETASOL PROPIONATE 0.5 MG/G
OINTMENT TOPICAL
Qty: 45 G | Refills: 0 | Status: SHIPPED | OUTPATIENT
Start: 2023-01-31

## 2023-01-31 NOTE — PROGRESS NOTES
Antoni Olmos (: 1975) is a 41 y.o. [id number], Established patient, here for evaluation of the following chief complaint(s):    Vaginal Complaint    HPI:  Denies vaginal discharge/odor   Has vaginal itching/irritation since last Thursday. Has tried Coconut oil 2-3x/day w/ no relief. Reports she had left over diflucan and took one tablet w/ no relief. Used external itching cream (thinks generic Monistat), one application w/ no relief. C/o pain when urine touched skin and afterwards felt itchy/irritated. Any new sexual partners? No - does not desire STI screening. ASSESSMENT/PLAN:  1. Acute vaginitis  Overview:  23: c/o vulvar itching, irritation, swelling. Very light erythematous rash noted on labia. Nuswab obtained. Possible yeast infection vs contact dermatitis. Pt reports possibly using different detergent (usually uses hypoallergenic detergent). Will try clobetasol cream nightly x2w. F/u in 2w with US 2/2 h/o uterine polyps. Orders:  -     Nuswab Vaginitis with Candida (Six Species); Future  -     clobetasol (TEMOVATE) 0.05 % ointment; Apply pea size amount topically to affected area nightly x 14d, Disp-45 g, R-0, Normal  2. Vulvar mass  Overview:  23: 0.5cm sessile, verrucous mass with abnormal coloration noted near urethral meatus on the right, biopsy taken. Orders:  -     BIOPSY VULVA/PERINEUM,ONE LESN  -     [facility]ology;  Future     Return in about 2 weeks (around 2023) for GYN f/u with US.    OBJECTIVE:  /74   Ht 5' 4\" (1.626 m)   Wt 138 lb 12.8 oz (63 kg)   BMI 23.82 kg/m²      Past Medical History:   Diagnosis Date    Abnormal Papanicolaou smear of cervix     Anemia     no treatment currently     Endometriosis     History of 2019 novel coronavirus disease (COVID-19) 2022    denies hospitalization    History of pneumonia 2014    x 3 total--- last dx 2014    PONV (postoperative nausea and vomiting)     multiple episodes, SEVERE with 2021 Pelvic lap    Ruptured uterus during labor     after a v-kristy attempt    Uterine polyp       Current Outpatient Medications   Medication Sig Dispense Refill    clobetasol (TEMOVATE) 0.05 % ointment Apply pea size amount topically to affected area nightly x 14d 45 g 0    drospirenone-ethinyl estradiol (DEJUAN) 3-0.02 MG per tablet Take 1 tablet by mouth daily 1 packet 11    Probiotic Product (PROBIOTIC BLEND PO) Take 1 tablet by mouth daily Gummy      Multiple Vitamin (THERAPEUTIC) TABS tablet Take 1 tablet by mouth daily       No current facility-administered medications for this visit.      Allergies   Allergen Reactions    Azithromycin Hives and Other (See Comments)     Abdominal pain    Sulfa Antibiotics Hives, Nausea And Vomiting and Rash     Fever     Social History     Socioeconomic History    Marital status:      Spouse name: Not on file    Number of children: Not on file    Years of education: Not on file    Highest education level: Not on file   Occupational History    Not on file   Tobacco Use    Smoking status: Never    Smokeless tobacco: Never   Vaping Use    Vaping Use: Never used   Substance and Sexual Activity    Alcohol use: Yes     Comment: 2 drinks/month    Drug use: No    Sexual activity: Yes     Partners: Male     Birth control/protection: Pill   Other Topics Concern    Not on file   Social History Narrative    Not on file     Social Determinants of Health     Financial Resource Strain: Not on file   Food Insecurity: Not on file   Transportation Needs: Not on file   Physical Activity: Not on file   Stress: Not on file   Social Connections: Not on file   Intimate Partner Violence: Not on file   Housing Stability: Not on file      Past Surgical History:   Procedure Laterality Date     SECTION      x2    CHOLECYSTECTOMY, LAPAROSCOPIC  2019    CYSTOSCOPY N/A 6/10/2022    CYSTOSCOPY  WITHN HYDRODISTENTION performed by Cruz Koch MD at [place] [address] PELVIC LAPAROSCOPY  2021    4 Pelvic Laps, last 2021- endometriosis      OB History    Para Term  AB Living   2 2 2     2   SAB IAB Ectopic Molar Multiple Live Births             2      # Outcome Date GA Lbr Dima/2nd Weight Sex Delivery Anes PTL Lv   2 Term 10/04/11    M CS-Unspec   DOUGLAS   1 Term 06    M CS-Unspec   DOUGLAS          Review of Systems    OBGyn Exam             An electronic signature was used to authenticate this note.     --Ashlie Kate MD

## 2023-02-03 LAB
A VAGINAE DNA VAG QL NAA+PROBE: ABNORMAL SCORE
BACTERIAL VAGINOSIS, NAA: ABNORMAL
BVAB2 DNA VAG QL NAA+PROBE: ABNORMAL SCORE
C ALBICANS DNA VAG QL NAA+PROBE: POSITIVE
C GLABRATA DNA VAG QL NAA+PROBE: NEGATIVE
CANDIDA KRUSEI: NEGATIVE
CANDIDA LUSITANIAE, NAA, 180015: NEGATIVE
CANDIDA PARAPSILOSIS/TROPICALIS: NEGATIVE
MEGA1 DNA VAG QL NAA+PROBE: ABNORMAL SCORE
SPECIMEN SOURCE: ABNORMAL
T VAGINALIS RRNA SPEC QL NAA+PROBE: NEGATIVE

## 2023-12-05 ENCOUNTER — PATIENT MESSAGE (OUTPATIENT)
Dept: OBGYN CLINIC | Age: 48
End: 2023-12-05

## 2023-12-06 RX ORDER — DROSPIRENONE AND ETHINYL ESTRADIOL 0.02-3(28)
1 KIT ORAL DAILY
Qty: 1 PACKET | Refills: 1 | Status: SHIPPED | OUTPATIENT
Start: 2023-12-06

## 2023-12-06 NOTE — TELEPHONE ENCOUNTER
Prescription sent to pharmacy per standing orders.     Orders Placed This Encounter    drospirenone-ethinyl estradiol (DEJUAN) 3-0.02 MG per tablet     Sig: Take 1 tablet by mouth daily     Dispense:  1 packet     Refill:  1

## 2023-12-06 NOTE — TELEPHONE ENCOUNTER
From: Karthikeyan Campo  To: Dr. Hayder Tompkins: 12/5/2023 9:47 PM EST  Subject: refill on the generic of Ximena    Weikathy. I have an appointment with Dr. Tab Domínguez in January. Unfortunately my prescription for Rosey May will run out this weekend. Could you call in a one month prescription for me until I meet with the doctor. I use the [facility] at [address] [place] [address]. The phone number is [phone]. Thank you.  Corky Ortiz [phone]

## 2024-01-04 SDOH — ECONOMIC STABILITY: INCOME INSECURITY: HOW HARD IS IT FOR YOU TO PAY FOR THE VERY BASICS LIKE FOOD, HOUSING, MEDICAL CARE, AND HEATING?: NOT VERY HARD

## 2024-01-04 SDOH — ECONOMIC STABILITY: TRANSPORTATION INSECURITY
IN THE PAST 12 MONTHS, HAS LACK OF TRANSPORTATION KEPT YOU FROM MEETINGS, WORK, OR FROM GETTING THINGS NEEDED FOR DAILY LIVING?: NO

## 2024-01-04 SDOH — ECONOMIC STABILITY: HOUSING INSECURITY
IN THE LAST 12 MONTHS, WAS THERE A TIME WHEN YOU DID NOT HAVE A STEADY PLACE TO SLEEP OR SLEPT IN A SHELTER (INCLUDING NOW)?: NO

## 2024-01-04 SDOH — ECONOMIC STABILITY: FOOD INSECURITY: WITHIN THE PAST 12 MONTHS, THE FOOD YOU BOUGHT JUST DIDN'T LAST AND YOU DIDN'T HAVE MONEY TO GET MORE.: NEVER TRUE

## 2024-01-04 SDOH — ECONOMIC STABILITY: FOOD INSECURITY: WITHIN THE PAST 12 MONTHS, YOU WORRIED THAT YOUR FOOD WOULD RUN OUT BEFORE YOU GOT MONEY TO BUY MORE.: NEVER TRUE

## 2024-01-04 NOTE — PROGRESS NOTES
2024    Jayla Campo  1975  Age: 48 y.o.    Patient's last menstrual period was 2023 (approximate).      PCP: Unknown, Provider, DO  Patient does see them for regular preventative visits.      HPI:  who is here today for a well woman exam. She complains of Weight gain related to possible Pre Menopause symptoms. Onset for approximately 5 months.     Date Performed Result   PAP 2021 Negative; HPV Negative   Mammogram 2020 Negative   Colonoscopy Over 12 years ago Endometriosis   Dexa N/a                   Allergies, medications, past medical and surgical history, social history, family history all reviewed.       Review of Systems  Constitutional:  Denies unexplained weight loss or heat or cold intolerance  ENT: Denies change in vision, change in hearing, frequent headaches  Cardiovascular:  Denies chest pain, swelling in legs or feet, shortness of breath when lying flat  Respiratory:  Denies shortness of breath, cough greater than 2 weeks or coughing up blood  Gastro: Denies diarrhea greater than 2 weeks, rectal bleeding, bloody stools, heartburn, or constipation  :  Denies blood in urine, nocturia, dysuria or incontinence  Breast:  Denies nipple discharge, masses or pain  Skin:  Denies rash greater than 2 weeks, change in moles  Musculoskeletal/Neuro:  Denies joint pain, muscle weakness, seizures, loss of balance or frequent falls  Psych:  Denies frequent crying spells or severe anxiety  Heme:  Denies easy bruising, bleeding gums, frequent nosebleeds or swollen lymph nodes  GYN:  Denies bleeding or spotting between menses, heavy menses, menses longer than 7 days, pain with sex, severe menstrual cramps.    Vitals:    24 0901   BP: 112/64   Weight: 65.3 kg (143 lb 14.4 oz)   Height: 1.626 m (5' 4\")       Body mass index is 24.7 kg/m².    Pt in no distress. Alert and oriented x3. Affect bright.  Well developed, well nourished.  HEENT: normocephalic, atraumatic. Sclerae

## 2024-01-05 ENCOUNTER — OFFICE VISIT (OUTPATIENT)
Dept: OBGYN CLINIC | Age: 49
End: 2024-01-05

## 2024-01-05 VITALS
BODY MASS INDEX: 24.57 KG/M2 | WEIGHT: 143.9 LBS | HEIGHT: 64 IN | DIASTOLIC BLOOD PRESSURE: 64 MMHG | SYSTOLIC BLOOD PRESSURE: 112 MMHG

## 2024-01-05 DIAGNOSIS — Z12.39 ENCOUNTER FOR SCREENING FOR MALIGNANT NEOPLASM OF BREAST, UNSPECIFIED SCREENING MODALITY: Primary | ICD-10-CM

## 2024-01-05 DIAGNOSIS — Z13.89 SCREENING FOR GENITOURINARY CONDITION: ICD-10-CM

## 2024-01-05 LAB
BILIRUBIN, URINE, POC: NEGATIVE
BLOOD URINE, POC: NEGATIVE
GLUCOSE URINE, POC: NEGATIVE
KETONES, URINE, POC: NEGATIVE
LEUKOCYTE ESTERASE, URINE, POC: NEGATIVE
NITRITE, URINE, POC: NEGATIVE
PH, URINE, POC: 6 (ref 4.6–8)
PROTEIN,URINE, POC: NEGATIVE
SPECIFIC GRAVITY, URINE, POC: 1.01 (ref 1–1.03)
URINALYSIS CLARITY, POC: NORMAL
URINALYSIS COLOR, POC: NORMAL
UROBILINOGEN, POC: NORMAL

## 2024-01-05 RX ORDER — DROSPIRENONE AND ETHINYL ESTRADIOL 0.02-3(28)
1 KIT ORAL DAILY
Qty: 1 PACKET | Refills: 11 | Status: SHIPPED | OUTPATIENT
Start: 2024-01-05

## 2024-01-24 ENCOUNTER — HOSPITAL ENCOUNTER (OUTPATIENT)
Dept: MAMMOGRAPHY | Age: 49
Discharge: HOME | End: 2024-01-27

## 2024-01-24 DIAGNOSIS — Z12.39 ENCOUNTER FOR SCREENING FOR MALIGNANT NEOPLASM OF BREAST, UNSPECIFIED SCREENING MODALITY: ICD-10-CM

## 2025-04-03 ENCOUNTER — TRANSCRIBE ORDERS (OUTPATIENT)
Facility: HOSPITAL | Age: 50
End: 2025-04-03

## 2025-04-03 DIAGNOSIS — Z12.31 OTHER SCREENING MAMMOGRAM: Primary | ICD-10-CM

## 2025-04-04 ENCOUNTER — HOSPITAL ENCOUNTER (OUTPATIENT)
Dept: MAMMOGRAPHY | Age: 50
Discharge: HOME | End: 2025-04-04
Attending: OBSTETRICS & GYNECOLOGY
Payer: COMMERCIAL

## 2025-04-04 VITALS — WEIGHT: 140 LBS | BODY MASS INDEX: 23.9 KG/M2 | HEIGHT: 64 IN

## 2025-04-04 DIAGNOSIS — Z12.31 OTHER SCREENING MAMMOGRAM: ICD-10-CM

## 2025-04-04 PROCEDURE — 77063 BREAST TOMOSYNTHESIS BI: CPT

## 2025-04-14 ENCOUNTER — HOSPITAL ENCOUNTER (OUTPATIENT)
Dept: MAMMOGRAPHY | Age: 50
Discharge: HOME | End: 2025-04-17
Attending: OBSTETRICS & GYNECOLOGY
Payer: COMMERCIAL

## 2025-04-14 DIAGNOSIS — R92.8 ABNORMAL SCREENING MAMMOGRAM: ICD-10-CM

## 2025-04-14 PROCEDURE — G0279 TOMOSYNTHESIS, MAMMO: HCPCS

## 2025-04-23 ENCOUNTER — HOSPITAL ENCOUNTER (OUTPATIENT)
Dept: MAMMOGRAPHY | Age: 50
Discharge: HOME | End: 2025-04-26
Payer: COMMERCIAL

## 2025-04-23 DIAGNOSIS — R92.1 BREAST CALCIFICATION, RIGHT: ICD-10-CM

## 2025-04-23 PROCEDURE — 6360000002 HC RX W HCPCS: Performed by: OBSTETRICS & GYNECOLOGY

## 2025-04-23 PROCEDURE — 88305 TISSUE EXAM BY PATHOLOGIST: CPT

## 2025-04-23 PROCEDURE — 2500000003 HC RX 250 WO HCPCS: Performed by: OBSTETRICS & GYNECOLOGY

## 2025-04-23 PROCEDURE — 19081 BX BREAST 1ST LESION STRTCTC: CPT

## 2025-04-23 PROCEDURE — 77065 DX MAMMO INCL CAD UNI: CPT

## 2025-04-23 PROCEDURE — 76098 X-RAY EXAM SURGICAL SPECIMEN: CPT

## 2025-04-23 RX ORDER — LIDOCAINE HYDROCHLORIDE AND EPINEPHRINE 10; 10 MG/ML; UG/ML
20 INJECTION, SOLUTION INFILTRATION; PERINEURAL ONCE
Status: COMPLETED | OUTPATIENT
Start: 2025-04-23 | End: 2025-04-23

## 2025-04-23 RX ORDER — LIDOCAINE HYDROCHLORIDE 10 MG/ML
5 INJECTION, SOLUTION INFILTRATION; PERINEURAL ONCE
Status: COMPLETED | OUTPATIENT
Start: 2025-04-23 | End: 2025-04-23

## 2025-04-23 RX ORDER — MAGNESIUM HYDROXIDE 1200 MG/15ML
250 LIQUID ORAL ONCE
Status: COMPLETED | OUTPATIENT
Start: 2025-04-23 | End: 2025-04-23

## 2025-04-23 RX ADMIN — SODIUM CHLORIDE 250 ML: 900 IRRIGANT IRRIGATION at 12:03

## 2025-04-23 RX ADMIN — LIDOCAINE HYDROCHLORIDE 3 ML: 10 INJECTION, SOLUTION INFILTRATION; PERINEURAL at 12:02

## 2025-04-23 RX ADMIN — LIDOCAINE HYDROCHLORIDE,EPINEPHRINE BITARTRATE 13 ML: 10; .01 INJECTION, SOLUTION INFILTRATION; PERINEURAL at 12:03

## 2025-04-23 ASSESSMENT — PAIN SCALES - GENERAL
PAINLEVEL_OUTOF10: 2
PAINLEVEL_OUTOF10: 2

## 2025-04-25 ENCOUNTER — TELEPHONE (OUTPATIENT)
Dept: MAMMOGRAPHY | Age: 50
End: 2025-04-25

## 2025-04-25 NOTE — TELEPHONE ENCOUNTER
Called patient regarding biopsy results. Biopsy results came back benign (normal). Radiologist is recommending patient return for routine annual screening in April 2026. Explained recommendation to patient and she understood and agreed.

## 2025-04-28 SDOH — ECONOMIC STABILITY: INCOME INSECURITY: IN THE LAST 12 MONTHS, WAS THERE A TIME WHEN YOU WERE NOT ABLE TO PAY THE MORTGAGE OR RENT ON TIME?: NO

## 2025-04-28 SDOH — ECONOMIC STABILITY: FOOD INSECURITY: WITHIN THE PAST 12 MONTHS, YOU WORRIED THAT YOUR FOOD WOULD RUN OUT BEFORE YOU GOT MONEY TO BUY MORE.: NEVER TRUE

## 2025-04-28 SDOH — ECONOMIC STABILITY: FOOD INSECURITY: WITHIN THE PAST 12 MONTHS, THE FOOD YOU BOUGHT JUST DIDN'T LAST AND YOU DIDN'T HAVE MONEY TO GET MORE.: NEVER TRUE

## 2025-04-28 SDOH — ECONOMIC STABILITY: TRANSPORTATION INSECURITY
IN THE PAST 12 MONTHS, HAS THE LACK OF TRANSPORTATION KEPT YOU FROM MEDICAL APPOINTMENTS OR FROM GETTING MEDICATIONS?: NO

## 2025-04-29 NOTE — PROGRESS NOTES
HPI:  Ms. Campo is a 50 y.o.   OB History          2    Para   2    Term   2            AB        Living   2         SAB        IAB        Ectopic        Molar        Multiple        Live Births   2             who is here today for a well woman exam. She complains of nothing. Pt would like to discuss vitamins. Pt states after 6 mos of no menses, light bleeding for around 4 days.   She states her libido has improved over the past several weeks, admits some vaginal dryness.  Pt would like to discuss going back on OCP.     Date Performed Result   PAP 2021 NILM; HPV Negative   Mammogram 2025 Right breast calcifications    Indetermine calcifications in the lower outer right breast ([facility])    Vacuum assisted stereotactic biopsy of calcifications in the rt breast  A. Breast, right, calcifications at 8 o'clock, stereotactic biopsy:   - Columnar cell change/hyperplasia and fibrocystic changes with scattered   microcalcifications.   - Pseudoangiomatous stromal hyperplasia.    Colonoscopy     Dexa                   GYN History           Patient's last menstrual period was 2025. Cycle Length Irregular Lasting 3  trace positive dysmenorrhea; trace positive postcoital bleeding    Past Medical History:  Past Medical History:   Diagnosis Date    Abnormal Papanicolaou smear of cervix     Anemia     no treatment currently     Endometriosis     History of 2019 novel coronavirus disease (COVID-19) 2022    denies hospitalization    History of pneumonia 2014    x 3 total--- last dx 2014    PONV (postoperative nausea and vomiting)     multiple episodes, SEVERE with 2021 Pelvic lap    Ruptured uterus during labor     after a v-kristy attempt    Uterine polyp        Past Surgical History:  Past Surgical History:   Procedure Laterality Date    BREAST BIOPSY Right 2025    Stereotactic Biopsy     SECTION      x2    CHOLECYSTECTOMY, LAPAROSCOPIC

## 2025-04-30 ENCOUNTER — OFFICE VISIT (OUTPATIENT)
Dept: OBGYN CLINIC | Age: 50
End: 2025-04-30
Payer: COMMERCIAL

## 2025-04-30 VITALS — SYSTOLIC BLOOD PRESSURE: 130 MMHG | DIASTOLIC BLOOD PRESSURE: 70 MMHG

## 2025-04-30 DIAGNOSIS — Z11.51 SCREENING FOR HUMAN PAPILLOMAVIRUS (HPV): ICD-10-CM

## 2025-04-30 DIAGNOSIS — Z12.11 SCREENING FOR COLON CANCER: ICD-10-CM

## 2025-04-30 DIAGNOSIS — Z12.4 SCREENING FOR CERVICAL CANCER: ICD-10-CM

## 2025-04-30 DIAGNOSIS — N95.1 MENOPAUSAL SYNDROME: ICD-10-CM

## 2025-04-30 DIAGNOSIS — Z13.89 SCREENING FOR GENITOURINARY CONDITION: ICD-10-CM

## 2025-04-30 DIAGNOSIS — Z01.419 ENCOUNTER FOR GYNECOLOGICAL EXAMINATION WITHOUT ABNORMAL FINDING: Primary | ICD-10-CM

## 2025-04-30 LAB
BILIRUBIN, URINE, POC: NEGATIVE
BLOOD URINE, POC: NORMAL
GLUCOSE URINE, POC: NEGATIVE
KETONES, URINE, POC: NEGATIVE
LEUKOCYTE ESTERASE, URINE, POC: NEGATIVE
NITRITE, URINE, POC: NEGATIVE
PH, URINE, POC: 5.5 (ref 4.6–8)
PROTEIN,URINE, POC: NEGATIVE
SPECIFIC GRAVITY, URINE, POC: 1.02 (ref 1–1.03)
URINALYSIS CLARITY, POC: CLEAR
URINALYSIS COLOR, POC: YELLOW
UROBILINOGEN, POC: NORMAL MG/DL

## 2025-04-30 PROCEDURE — 99459 PELVIC EXAMINATION: CPT | Performed by: OBSTETRICS & GYNECOLOGY

## 2025-04-30 PROCEDURE — 99396 PREV VISIT EST AGE 40-64: CPT | Performed by: OBSTETRICS & GYNECOLOGY

## 2025-04-30 PROCEDURE — 81002 URINALYSIS NONAUTO W/O SCOPE: CPT | Performed by: OBSTETRICS & GYNECOLOGY

## 2025-05-05 ENCOUNTER — RESULTS FOLLOW-UP (OUTPATIENT)
Dept: OBGYN CLINIC | Age: 50
End: 2025-05-05

## 2025-05-05 ENCOUNTER — PROCEDURE VISIT (OUTPATIENT)
Dept: OBGYN CLINIC | Age: 50
End: 2025-05-05
Payer: COMMERCIAL

## 2025-05-05 DIAGNOSIS — R87.618 OTHER ABNORMAL CYTOLOGICAL FINDING OF SPECIMEN FROM CERVIX: Primary | ICD-10-CM

## 2025-05-05 LAB
COLLECTION METHOD: NORMAL
CYTOLOGIST CVX/VAG CYTO: NORMAL
CYTOLOGY CVX/VAG DOC THIN PREP: NORMAL
DATE OF LMP: NORMAL
HPV APTIMA: NEGATIVE
Lab: NORMAL
Lab: NORMAL
PAP SOURCE: NORMAL
PATH REPORT.FINAL DX SPEC: NORMAL
PREV TREATMENT: NORMAL
STAT OF ADQ CVX/VAG CYTO-IMP: NORMAL

## 2025-05-05 PROCEDURE — 76830 TRANSVAGINAL US NON-OB: CPT | Performed by: OBSTETRICS & GYNECOLOGY

## 2025-05-05 PROCEDURE — 76857 US EXAM PELVIC LIMITED: CPT | Performed by: OBSTETRICS & GYNECOLOGY

## 2025-05-05 NOTE — TELEPHONE ENCOUNTER
----- Message from EDMUNDO AVALOS MA sent at 5/5/2025 10:50 AM EDT -----    ----- Message -----  From: Jolene Bower MA  Sent: 5/5/2025  10:09 AM EDT  To: Damaris Horne MA      ----- Message -----  From: Kb Magallanes MD  Sent: 5/5/2025   9:37 AM EDT  To: Jolene Bower MA    Please let her know that her pap smear is probably normal but USG is recommended to evaluate inside uterus. Please make her an appt for pelvic USG only in the next few weeks.  ERLIN

## 2025-05-05 NOTE — PROGRESS NOTES
Emmy Land MD   Bariatric & Advanced Laparoscopic Surgery & Endoscopy  [address], [address]  [place]  [address]  Phone [phone]   Fax [phone]      Date of visit: 2025      Primary/Requesting provider: Unknown, Provider         Name: Jayla Campo      MRN: [id number]       : 1975       Age: 50 y.o.    Sex: female        PCP: Unknown, Provider     CC:  No chief complaint on file.      Jayla Campo is a 50 y.o. female who was seen by synchronous (real-time) audio-video technology on 2025.      I was in the officewhile conducting this encounter.    Office Address: [address] [place] [address] [address], [facility], [address]    Consent:  Jayla Campo and/or her healthcare decision maker is aware that this patient-initiated Telehealth encounter is a billable service, with coverage as determined by her insurance carrier. She is aware that she may receive a bill and has provided verbal consent to proceed: Yes    This virtual visit was conducted by My Chart.  and first and last name verified. I am an appropriately licensed physician to deliver care in the state of [place] where the patient is located as indicated above.             HPI:     Jayla Campo is a 50 y.o. female was referred here for a colonoscopy by PCP.       Previous colonoscopy YES - 14 years ago - no polyps   Family hx of colon cancer Father with history of colon polyps     BRBPR NO   Melena NO   Loss of appetite NO   Weight loss NO     Change in bowel habits NO     Smoking - denies  Blood thinners - denies    PMH:    Past Medical History:   Diagnosis Date    Abnormal Papanicolaou smear of cervix     Anemia     no treatment currently     Endometriosis     History of 2019 novel coronavirus disease (COVID-19) 2022    denies hospitalization    History of pneumonia 2014    x 3 total--- last dx 2014    PONV (postoperative nausea and vomiting)     multiple episodes, SEVERE

## 2025-05-07 ENCOUNTER — TELEMEDICINE (OUTPATIENT)
Dept: SURGERY | Age: 50
End: 2025-05-07
Payer: COMMERCIAL

## 2025-05-07 ENCOUNTER — RESULTS FOLLOW-UP (OUTPATIENT)
Dept: OBGYN CLINIC | Age: 50
End: 2025-05-07

## 2025-05-07 DIAGNOSIS — Z83.719 FAMILY HISTORY OF COLONIC POLYPS: Primary | ICD-10-CM

## 2025-05-07 PROCEDURE — 99203 OFFICE O/P NEW LOW 30 MIN: CPT | Performed by: SURGERY

## 2025-05-08 ENCOUNTER — PREP FOR PROCEDURE (OUTPATIENT)
Dept: SURGERY | Age: 50
End: 2025-05-08

## 2025-05-08 DIAGNOSIS — Z12.11 SCREEN FOR COLON CANCER: ICD-10-CM

## 2025-06-07 PROBLEM — Z12.11 SCREEN FOR COLON CANCER: Status: RESOLVED | Noted: 2025-05-08 | Resolved: 2025-06-07

## 2025-06-11 NOTE — PERIOP NOTE
Patient verified name, , and procedure.    Type: 1a; abbreviated assessment per anesthesia guidelines    Labs per anesthesia: None    Instructed pt that they will be notified the day before their procedure by the GI Lab for time of arrival if their procedure is [place] and Pre-op for [place] cases. Arrival times should be called by 5 pm. If no phone is received the patient should contact their respective hospital. The GI lab telephone number is [phone] and ES Pre-op is [phone].     Follow diet and prep instructions per office. Nothing to eat or drink after midnight (this excludes prep).      Bath or shower the night before and the am of surgery with non-moisturizing soap. No lotions, oils, powders, cologne on skin. No make up, eye make up or jewelry. Wear loose fitting comfortable, clean clothing.     Must have adult present in building the entire time .     Medications for the day of procedure NONE, patient to hold vitamins, supplements, herbals 7 days prior to procedure and NSAIDs 5 days prior to procedure per anesthesia guidelines.     The following discharge instructions reviewed with patient: medication given during procedure may cause drowsiness for several hours, therefore, do not drive or operate machinery for remainder of the day. You may not drink alcohol on the day of your procedure, please resume regular diet and activity unless otherwise directed. You may experience abdominal distention for several hours that is relieved by the passage of gas. Contact your physician if you have any of the following: fever or chills, severe abdominal pain or excessive amount of bleeding or a large amount when having a bowel movement. Occasional specks of blood with bowel movement would not be unusual.

## 2025-06-24 ENCOUNTER — TELEPHONE (OUTPATIENT)
Dept: SURGERY | Age: 50
End: 2025-06-24

## 2025-06-24 NOTE — TELEPHONE ENCOUNTER
Taj Chopra scheduled for: 6/27/25       *Has patient picked up medications Miralax, Dulcolax, Gatorade or drink of choice-not red)?       Yes       *Discussed instructions for how to take these and instructions for the week prior to your procedure?      Yes       *Discussed instructions for the next couple of days?     Yes       *Patient reminded of location of procedure and time of arrival?     Yes       *Who will be bringing patient and staying at the hospital with them during your procedure?  Helder      *Informed patient that they should receive a call from the hospital as well to pre-register them for this procedure?     Yes       *Informed them of contact info if needed to cancel or reschedule this procedure?     Yes

## 2025-06-26 RX ORDER — SODIUM CHLORIDE 0.9 % (FLUSH) 0.9 %
5-40 SYRINGE (ML) INJECTION EVERY 12 HOURS SCHEDULED
Status: CANCELLED | OUTPATIENT
Start: 2025-06-26

## 2025-06-26 RX ORDER — SODIUM CHLORIDE 9 MG/ML
INJECTION, SOLUTION INTRAVENOUS PRN
Status: CANCELLED | OUTPATIENT
Start: 2025-06-26

## 2025-06-26 RX ORDER — SODIUM CHLORIDE 0.9 % (FLUSH) 0.9 %
5-40 SYRINGE (ML) INJECTION PRN
Status: CANCELLED | OUTPATIENT
Start: 2025-06-26

## 2025-06-27 ENCOUNTER — ANESTHESIA EVENT (OUTPATIENT)
Dept: ENDOSCOPY | Age: 50
End: 2025-06-27
Payer: COMMERCIAL

## 2025-06-27 ENCOUNTER — HOSPITAL ENCOUNTER (OUTPATIENT)
Age: 50
Setting detail: OUTPATIENT SURGERY
Discharge: HOME | End: 2025-06-27
Attending: SURGERY | Admitting: SURGERY
Payer: COMMERCIAL

## 2025-06-27 ENCOUNTER — ANESTHESIA (OUTPATIENT)
Dept: ENDOSCOPY | Age: 50
End: 2025-06-27
Payer: COMMERCIAL

## 2025-06-27 VITALS
DIASTOLIC BLOOD PRESSURE: 81 MMHG | SYSTOLIC BLOOD PRESSURE: 128 MMHG | OXYGEN SATURATION: 100 % | HEIGHT: 64 IN | BODY MASS INDEX: 25 KG/M2 | HEART RATE: 68 BPM | WEIGHT: 146.4 LBS | TEMPERATURE: 98 F | RESPIRATION RATE: 16 BRPM

## 2025-06-27 DIAGNOSIS — Z12.11 SCREEN FOR COLON CANCER: ICD-10-CM

## 2025-06-27 LAB — HCG UR QL: NEGATIVE

## 2025-06-27 PROCEDURE — 6360000002 HC RX W HCPCS: Performed by: REGISTERED NURSE

## 2025-06-27 PROCEDURE — 2709999900 HC NON-CHARGEABLE SUPPLY: Performed by: SURGERY

## 2025-06-27 PROCEDURE — 88305 TISSUE EXAM BY PATHOLOGIST: CPT

## 2025-06-27 PROCEDURE — 2580000003 HC RX 258: Performed by: ANESTHESIOLOGY

## 2025-06-27 PROCEDURE — 81025 URINE PREGNANCY TEST: CPT

## 2025-06-27 PROCEDURE — 3609010400 HC COLONOSCOPY POLYPECTOMY HOT BIOPSY: Performed by: SURGERY

## 2025-06-27 PROCEDURE — 3700000000 HC ANESTHESIA ATTENDED CARE: Performed by: SURGERY

## 2025-06-27 PROCEDURE — 7100000011 HC PHASE II RECOVERY - ADDTL 15 MIN: Performed by: SURGERY

## 2025-06-27 PROCEDURE — 7100000010 HC PHASE II RECOVERY - FIRST 15 MIN: Performed by: SURGERY

## 2025-06-27 PROCEDURE — 6360000002 HC RX W HCPCS: Performed by: ANESTHESIOLOGY

## 2025-06-27 PROCEDURE — 3700000001 HC ADD 15 MINUTES (ANESTHESIA): Performed by: SURGERY

## 2025-06-27 RX ORDER — LIDOCAINE HYDROCHLORIDE 20 MG/ML
INJECTION, SOLUTION EPIDURAL; INFILTRATION; INTRACAUDAL; PERINEURAL
Status: DISCONTINUED | OUTPATIENT
Start: 2025-06-27 | End: 2025-06-27 | Stop reason: SDUPTHER

## 2025-06-27 RX ORDER — NALOXONE HYDROCHLORIDE 0.4 MG/ML
INJECTION, SOLUTION INTRAMUSCULAR; INTRAVENOUS; SUBCUTANEOUS PRN
Status: DISCONTINUED | OUTPATIENT
Start: 2025-06-27 | End: 2025-06-27 | Stop reason: HOSPADM

## 2025-06-27 RX ORDER — SODIUM CHLORIDE, SODIUM LACTATE, POTASSIUM CHLORIDE, CALCIUM CHLORIDE 600; 310; 30; 20 MG/100ML; MG/100ML; MG/100ML; MG/100ML
INJECTION, SOLUTION INTRAVENOUS CONTINUOUS
Status: DISCONTINUED | OUTPATIENT
Start: 2025-06-27 | End: 2025-06-27 | Stop reason: HOSPADM

## 2025-06-27 RX ORDER — ONDANSETRON 2 MG/ML
4 INJECTION INTRAMUSCULAR; INTRAVENOUS
Status: DISCONTINUED | OUTPATIENT
Start: 2025-06-27 | End: 2025-06-27 | Stop reason: HOSPADM

## 2025-06-27 RX ORDER — PROPOFOL 10 MG/ML
INJECTION, EMULSION INTRAVENOUS
Status: DISCONTINUED | OUTPATIENT
Start: 2025-06-27 | End: 2025-06-27 | Stop reason: SDUPTHER

## 2025-06-27 RX ORDER — LIDOCAINE HYDROCHLORIDE 10 MG/ML
1 INJECTION, SOLUTION INFILTRATION; PERINEURAL
Status: COMPLETED | OUTPATIENT
Start: 2025-06-27 | End: 2025-06-27

## 2025-06-27 RX ADMIN — PROPOFOL 70 MG: 10 INJECTION, EMULSION INTRAVENOUS at 08:35

## 2025-06-27 RX ADMIN — LIDOCAINE HYDROCHLORIDE 1 ML: 10 INJECTION, SOLUTION INFILTRATION; PERINEURAL at 07:59

## 2025-06-27 RX ADMIN — LIDOCAINE HYDROCHLORIDE 30 MG: 20 INJECTION, SOLUTION EPIDURAL; INFILTRATION; INTRACAUDAL; PERINEURAL at 08:35

## 2025-06-27 RX ADMIN — PROPOFOL 200 MCG/KG/MIN: 10 INJECTION, EMULSION INTRAVENOUS at 08:36

## 2025-06-27 RX ADMIN — SODIUM CHLORIDE, SODIUM LACTATE, POTASSIUM CHLORIDE, AND CALCIUM CHLORIDE: .6; .31; .03; .02 INJECTION, SOLUTION INTRAVENOUS at 07:58

## 2025-06-27 ASSESSMENT — LIFESTYLE VARIABLES: SMOKING_STATUS: 0

## 2025-06-27 ASSESSMENT — PAIN - FUNCTIONAL ASSESSMENT: PAIN_FUNCTIONAL_ASSESSMENT: 0-10

## 2025-06-27 NOTE — ANESTHESIA POSTPROCEDURE EVALUATION
Department of Anesthesiology  Postprocedure Note    Patient: Jayla Campo  MRN: [id number]  YOB: 1975  Date of evaluation: 6/27/2025    Procedure Summary       Date: 06/27/25 Room / Location: [facility] ENDO 01 / [facility] ENDOSCOPY    Anesthesia Start: 0831 Anesthesia Stop: 0857    Procedure: COLONOSCOPY POLYPECTOMY HOT BIOPSY Diagnosis:       Screen for colon cancer      (Screen for colon cancer [Z12.11])    Surgeons: Emmy Ram MD Responsible Provider: Luis Coates MD    Anesthesia Type: TIVA ASA Status: 1            Anesthesia Type: No value filed.    Yue Phase I:      Yue Phase II: Yue Score: 10    Anesthesia Post Evaluation    Patient location during evaluation: PACU  Patient participation: complete - patient participated  Level of consciousness: awake and alert  Airway patency: patent  Nausea & Vomiting: no nausea and no vomiting  Cardiovascular status: hemodynamically stable  Respiratory status: acceptable, nonlabored ventilation and spontaneous ventilation  Hydration status: euvolemic  Comments: /81   Pulse 68   Temp 98 °F (36.7 °C)   Resp 16   Ht 1.626 m (5' 4\")   Wt 66.4 kg (146 lb 6.4 oz)   SpO2 100%   BMI 25.13 kg/m²     Multimodal analgesia pain management approach  Pain management: adequate and satisfactory to patient        No notable events documented.

## 2025-06-27 NOTE — H&P
Emmy Land MD   Bariatric & Advanced Laparoscopic Surgery & Endoscopy  [address], [address]  [place] [address]  Phone [phone]   Fax [phone]      Date of visit: 2025      Primary/Requesting provider: Unknown, JULIÁN Martins         Name: Jayla Campo      MRN: [id number]       : 1975       Age: 50 y.o.    Sex: female        PCP: Unknown, JULIÁN Martins     CC:  No chief complaint on file.          HPI:     Jayla Campo is a 50 y.o. female was referred here for a colonoscopy by PCP.       Previous colonoscopy YES - 14 years ago - no polyps   Family hx of colon cancer Father with history of colon polyps     BRBPR NO   Melena NO   Loss of appetite NO   Weight loss NO     Change in bowel habits NO     Smoking - denies  Blood thinners - denies    PMH:    Past Medical History:   Diagnosis Date    Abnormal Papanicolaou smear of cervix     Anemia     no treatment currently     Endometriosis     History of 2019 novel coronavirus disease (COVID-19) 2022    denies hospitalization    History of pneumonia 2014    x 3 total--- last dx 2014    PONV (postoperative nausea and vomiting)     multiple episodes, SEVERE with 2021 Pelvic lap    Ruptured uterus during labor     after a v-kristy attempt    Uterine polyp        PSH:    Past Surgical History:   Procedure Laterality Date    BREAST BIOPSY Right 2025    Stereotactic Biopsy     SECTION      x2    CHOLECYSTECTOMY, LAPAROSCOPIC  2019    CYSTOSCOPY N/A 06/10/2022    CYSTOSCOPY  WITHN HYDRODISTENTION performed by Edgar Noriega MD at [facility] MAIN OR    DILATION AND CURETTAGE OF UTERUS      SKY STEREO BREAST BX W LOC DEVICE 1ST LESION RIGHT Right 2025    SKY STEREO BREAST BX W LOC DEVICE 1ST LESION RIGHT 2025 Adrián Pryor MD [facility] RADIOLOGY MAMMO    PELVIC LAPAROSCOPY  2021    4 Pelvic Laps, last 2021- endometriosis       MEDS:    No current outpatient medications

## 2025-06-27 NOTE — DISCHARGE INSTRUCTIONS
Gastrointestinal Colonoscopy/Flexible Sigmoidoscopy - Lower Exam Discharge Instructions  Call Dr. Vang at [phone] for any problems or questions.  Contact the doctor’s office for follow up appointment as directed  Medication may cause drowsiness for several hours, therefore, do not drive or operate machinery for remainder of the day.  No alcohol today.  Do not make any important decisions such signing legal paperwork.  Ordinarily, you may resume regular diet and activity after exam unless otherwise specified by your physician.  Because of air put into your colon during exam, you may experience some abdominal distension, relieved by the passage of gas, for several hours.  Contact your physician if you have any of the following:  Excessive amount of bleeding - large amount when having a bowel movement.  Occasional specks of blood with bowel movement would not be unusual.  Severe abdominal pain  Fever or Chills  Polyp Removal - follow these additional instructions  Clear liquid diet for the next meal (jell-o, broth, clear drinks)  Soft diet for 24 hours, then resume regular diet   Take Metamucil - 1 tablespoon in juice every morning for 3 days, if needed.  No Aspirin, Advil, Aleve, Nuprin, Ibuprofen, or medications that contain these drugs for 2 weeks.    Any additional instructions:     Impression: One diminutive (1-3 mm) polyp in the rectum, removed with a hot biopsy forceps. Resected and retrieved. Non-bleeding non-thrombosed non-prolapsed internal hemorrhoids.     Recommendation: Await pathology results. Resume previous diet. Continue present medications. Repeat colonoscopy date to be determined after pending pathology results are reviewed for surveillance based on pathology results. Patient has a contact number available for emergencies. The signs and symptoms of potential delayed complications were discussed with the patient. Return to normal activities tomorrow. Written discharge instructions were provided  to the patient.

## 2025-06-27 NOTE — ANESTHESIA PRE PROCEDURE
Department of Anesthesiology  Preprocedure Note       Name:  Jayla Campo   Age:  50 y.o.  :  1975                                          MRN:  [id number]         Date:  2025      Surgeon: Surgeon(s):  Emmy Ram MD    Procedure: Procedure(s):  COLORECTAL CANCER SCREENING, NOT HIGH RISK    Medications prior to admission:   Prior to Admission medications    Not on File       Current medications:    Current Facility-Administered Medications   Medication Dose Route Frequency Provider Last Rate Last Admin    lidocaine 1 % injection 1 mL  1 mL IntraDERmal Once PRN Luis Coates MD        lactated ringers infusion   IntraVENous Continuous Luis Coates MD           Allergies:    Allergies   Allergen Reactions    Azithromycin Hives and Other (See Comments)     Abdominal pain    Sulfa Antibiotics Hives, Nausea And Vomiting and Rash     Fever       Problem List:    Patient Active Problem List   Diagnosis Code    Pelvic pain R10.2    Adenomyosis N80.03    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    Acute vaginitis N76.0    Vulvar mass N90.89       Past Medical History:        Diagnosis Date    Abnormal Papanicolaou smear of cervix     Anemia     no treatment currently     Endometriosis     History of 2019 novel coronavirus disease (COVID-19) 2022    denies hospitalization    History of pneumonia 2014    x 3 total--- last dx 2014    PONV (postoperative nausea and vomiting)     multiple episodes, SEVERE with 2021 Pelvic lap    Ruptured uterus during labor     after a v-kristy attempt    Uterine polyp        Past Surgical History:        Procedure Laterality Date    BREAST BIOPSY Right 2025    Stereotactic Biopsy     SECTION      x2    CHOLECYSTECTOMY, LAPAROSCOPIC  2019    CYSTOSCOPY N/A 06/10/2022    CYSTOSCOPY  WITHN HYDRODISTENTION performed by Edgar Noriega MD at [facility] MAIN OR    DILATION AND CURETTAGE OF UTERUS      SKY STEREO BREAST BX W LOC DEVICE 1ST

## 2025-07-03 ENCOUNTER — RESULTS FOLLOW-UP (OUTPATIENT)
Dept: SURGERY | Age: 50
End: 2025-07-03

## 2025-07-03 NOTE — TELEPHONE ENCOUNTER
Called and let patient know colo results. Patient stated understanding.    ----- Message from Dr. Emmy Land MD sent at 7/3/2025  1:25 PM EDT -----  Please call patient about pathology results.    Polyps are benign.    Colon recall in 5 years.     Emmy Land MD  Bariatric & Minimally Invasive Surgery  [facility]  7/3/2025 1:25 PM

## (undated) DEVICE — GYN LAPAROSCOPY: Brand: MEDLINE INDUSTRIES, INC.

## (undated) DEVICE — SUTURE VCRL SZ 4-0 L18IN ABSRB UD L19MM PS-2 3/8 CIR PRIM J496H

## (undated) DEVICE — LOGICUT SCISSOR LENGTH 320MM: Brand: LOGI - LAPAROSCOPIC INSTRUMENT SYSTEM

## (undated) DEVICE — SINGLE PORT MANIFOLD: Brand: NEPTUNE 2

## (undated) DEVICE — SYRINGE MEDICAL 3ML CLEAR PLASTIC STANDARD NON CONTROL LUERLOCK TIP DISPOSABLE

## (undated) DEVICE — ENDOSCOPIC KIT 1.1+ OP4 CA DE 2 GWN AAMI LEVEL 3

## (undated) DEVICE — (D)PREP SKN CHLRAPRP APPL 26ML -- CONVERT TO ITEM 371833

## (undated) DEVICE — SYRINGE MED 10ML LUERLOCK TIP W/O SFTY DISP

## (undated) DEVICE — CONTAINER FORMALIN PREFILLED 10% NBF 60ML

## (undated) DEVICE — Device

## (undated) DEVICE — AIRLIFE™ OXYGEN TUBING 7 FEET (2.1 M) CRUSH RESISTANT OXYGEN TUBING, VINYL TIPPED: Brand: AIRLIFE™

## (undated) DEVICE — YANKAUER,BULB TIP,W/O VENT,RIGID,STERILE: Brand: MEDLINE

## (undated) DEVICE — GAUZE,SPONGE,4"X4",12PLY,WOVEN,NS,LF: Brand: MEDLINE

## (undated) DEVICE — PACK SURGICAL PROCEDURE KIT CYSTOSCOPY TOTE

## (undated) DEVICE — FORCEP BX 2.2 MMX240 CM CHANNEL SERRATED RADIAL JAW 4

## (undated) DEVICE — TROCAR: Brand: KII FIOS FIRST ENTRY

## (undated) DEVICE — ABC PROBE 5 MM FOOTSWITCHING PROBE: Brand: ABC

## (undated) DEVICE — TROCAR RMFG Z 3RD SLEEVE 5X100 -- LAWSON OEM ITEM 262365 PK/6

## (undated) DEVICE — KIT,ANTI FOG,W/SPONGE & FLUID,SOFT PACK: Brand: MEDLINE

## (undated) DEVICE — 40585 XL ADVANCED TRENDELENBURG POSITIONING KIT: Brand: 40585 XL ADVANCED TRENDELENBURG POSITIONING KIT

## (undated) DEVICE — PAD MATERNITY 11IN W/TAILS -- STRL

## (undated) DEVICE — REM POLYHESIVE ADULT PATIENT RETURN ELECTRODE: Brand: VALLEYLAB

## (undated) DEVICE — SYRINGE CATH TIP 50ML

## (undated) DEVICE — SOLUTION IRRIG 3000ML H2O STRL BAG

## (undated) DEVICE — FORCEPS ES L33CM DIA5MM CUT ERGO CUT BLDE TRIG HND ACT

## (undated) DEVICE — Z DUPLICATE USE 2847003 INJECTOR UTER

## (undated) DEVICE — CONNECTOR TBNG OD5-7MM O2 END DISP

## (undated) DEVICE — DISPOSABLE BIOPSY VALVE MAJ-1555: Brand: SINGLE USE BIOPSY VALVE (STERILE)

## (undated) DEVICE — SYR 50ML LR LCK 1ML GRAD NSAF --

## (undated) DEVICE — [HIGH FLOW INSUFFLATOR,  DO NOT USE IF PACKAGE IS DAMAGED,  KEEP DRY,  KEEP AWAY FROM SUNLIGHT,  PROTECT FROM HEAT AND RADIOACTIVE SOURCES.]: Brand: PNEUMOSURE

## (undated) DEVICE — KENDALL RADIOLUCENT FOAM MONITORING ELECTRODE RECTANGULAR SHAPE: Brand: KENDALL

## (undated) DEVICE — CARDINAL HEALTH FLEXIBLE LIGHT HANDLE COVER: Brand: CARDINAL HEALTH

## (undated) DEVICE — SOLUTION IV 1000ML 0.9% SOD CHL

## (undated) DEVICE — SUTURE SZ 0 27IN 5/8 CIR UR-6  TAPER PT VIOLET ABSRB VICRYL J603H

## (undated) DEVICE — ELECTRODE PT RET AD L9FT HI MOIST COND ADH HYDRGEL CORDED

## (undated) DEVICE — DRAPE TWL SURG 16X26IN BLU ORB04] ALLCARE INC]

## (undated) DEVICE — TRAY PREP DRY W/ PREM GLV 2 APPL 6 SPNG 2 UNDPD 1 OVERWRAP